# Patient Record
Sex: FEMALE | Race: WHITE | HISPANIC OR LATINO | Employment: UNEMPLOYED | URBAN - METROPOLITAN AREA
[De-identification: names, ages, dates, MRNs, and addresses within clinical notes are randomized per-mention and may not be internally consistent; named-entity substitution may affect disease eponyms.]

---

## 2017-05-12 ENCOUNTER — TELEPHONE (OUTPATIENT)
Dept: OTOLARYNGOLOGY | Facility: CLINIC | Age: 37
End: 2017-05-12

## 2017-05-12 NOTE — TELEPHONE ENCOUNTER
----- Message from Liberty Higgins sent at 5/12/2017 12:03 PM CDT -----  Contact: pts kailey Kuhn at [phone]  Kvng pt-Pt of Dr. Calderon pt was last seen Nov. 2016.  Pt  now has growth on neck/throat?  And was seen for this before.  Now recurring and son is asking for appt asap.  Please call kailey Kuhn at [phone].

## 2017-05-12 NOTE — PROGRESS NOTES
Spoke with patient's son. 7 days of increasing pain and swelling of the throat. Worsening each day.      I recommended that she go to the emergency room for evaluation. I have concern that she may have developed a recurrent peritonsillar abscess which needs drainage.    I am out of the office this afternoon at a meeting, but we will schedule a follow up visit for her Monday.    All questions were answered, and the patient's son was in agreement.

## 2019-02-05 ENCOUNTER — OFFICE VISIT (OUTPATIENT)
Dept: OTOLARYNGOLOGY | Facility: CLINIC | Age: 39
End: 2019-02-05

## 2019-02-05 VITALS
HEART RATE: 75 BPM | DIASTOLIC BLOOD PRESSURE: 70 MMHG | SYSTOLIC BLOOD PRESSURE: 111 MMHG | WEIGHT: 189.19 LBS | HEIGHT: 64 IN | TEMPERATURE: 98 F | BODY MASS INDEX: 32.3 KG/M2

## 2019-02-05 DIAGNOSIS — L04.0 ACUTE CERVICAL ADENITIS: ICD-10-CM

## 2019-02-05 DIAGNOSIS — J35.8 TONSILLITH: ICD-10-CM

## 2019-02-05 DIAGNOSIS — J30.9 ALLERGIC RHINITIS, UNSPECIFIED SEASONALITY, UNSPECIFIED TRIGGER: ICD-10-CM

## 2019-02-05 DIAGNOSIS — J03.01 ACUTE RECURRENT STREPTOCOCCAL TONSILLITIS: Primary | ICD-10-CM

## 2019-02-05 DIAGNOSIS — Z87.09 HISTORY OF PERITONSILLAR ABSCESS: ICD-10-CM

## 2019-02-05 DIAGNOSIS — J34.2 NASAL SEPTAL DEVIATION: ICD-10-CM

## 2019-02-05 DIAGNOSIS — R19.6 HALITOSIS: ICD-10-CM

## 2019-02-05 PROCEDURE — 96372 PR INJECTION,THERAP/PROPH/DIAG2ST, IM OR SUBCUT: ICD-10-PCS | Mod: S$GLB,,, | Performed by: SPECIALIST

## 2019-02-05 PROCEDURE — 99214 OFFICE O/P EST MOD 30 MIN: CPT | Mod: 25,S$GLB,, | Performed by: SPECIALIST

## 2019-02-05 PROCEDURE — 99214 PR OFFICE/OUTPT VISIT, EST, LEVL IV, 30-39 MIN: ICD-10-PCS | Mod: 25,S$GLB,, | Performed by: SPECIALIST

## 2019-02-05 PROCEDURE — 96372 THER/PROPH/DIAG INJ SC/IM: CPT | Mod: S$GLB,,, | Performed by: SPECIALIST

## 2019-02-05 RX ORDER — BETAMETHASONE SODIUM PHOSPHATE AND BETAMETHASONE ACETATE 3; 3 MG/ML; MG/ML
6 INJECTION, SUSPENSION INTRA-ARTICULAR; INTRALESIONAL; INTRAMUSCULAR; SOFT TISSUE ONCE
Status: COMPLETED | OUTPATIENT
Start: 2019-02-05 | End: 2019-02-05

## 2019-02-05 RX ORDER — HYDROCODONE BITARTRATE AND ACETAMINOPHEN 5; 325 MG/1; MG/1
TABLET ORAL
Qty: 24 TABLET | Refills: 0 | Status: ON HOLD | OUTPATIENT
Start: 2019-02-05 | End: 2019-04-01 | Stop reason: HOSPADM

## 2019-02-05 RX ORDER — LIDOCAINE HYDROCHLORIDE 10 MG/ML
2 INJECTION INFILTRATION; PERINEURAL ONCE
Status: COMPLETED | OUTPATIENT
Start: 2019-02-05 | End: 2019-02-05

## 2019-02-05 RX ORDER — CEFTRIAXONE 1 G/1
1 INJECTION, POWDER, FOR SOLUTION INTRAMUSCULAR; INTRAVENOUS
Status: COMPLETED | OUTPATIENT
Start: 2019-02-05 | End: 2019-02-05

## 2019-02-05 RX ORDER — AMOXICILLIN 875 MG/1
875 TABLET, FILM COATED ORAL 2 TIMES DAILY
Qty: 20 TABLET | Refills: 0 | Status: SHIPPED | OUTPATIENT
Start: 2019-02-05 | End: 2019-02-15

## 2019-02-05 RX ADMIN — LIDOCAINE HYDROCHLORIDE 2 ML: 10 INJECTION INFILTRATION; PERINEURAL at 03:02

## 2019-02-05 RX ADMIN — CEFTRIAXONE 1 G: 1 INJECTION, POWDER, FOR SOLUTION INTRAMUSCULAR; INTRAVENOUS at 03:02

## 2019-02-05 RX ADMIN — BETAMETHASONE SODIUM PHOSPHATE AND BETAMETHASONE ACETATE 6 MG: 3; 3 INJECTION, SUSPENSION INTRA-ARTICULAR; INTRALESIONAL; INTRAMUSCULAR; SOFT TISSUE at 03:02

## 2019-02-05 NOTE — PATIENT INSTRUCTIONS
Amigdalectomía, sangrado posoperatorio  Se le feng extirpado las amígdalas. El sitio de la operación puede sangrar después la cirugía. Habitualmente puede ser controlado por morrison médico con presión directa o por aplicación de medicamento para contraer los vasos sanguíneos. Si esto fracasa, usted necesitará volver a la yen de operaciones para un tratamiento adicional. Notifique a morrison médico enseguida o vuelva a [facility] si hay signos de cualquier sangrado adicional.  Cuidados en la casa  · Le dolerá la garganta. Ginger dolor después de la cirugía se irá aliviando rocael los próximos jodie a delfin días. Es normal sentir más dolor al final de la primera semana antes de que finalmente desaparezca.   · Los alimentos blandos serán más fáciles de tragar.  · Anju muchos líquidos para prevenir la deshidratación. Debe continuar bebiendo aunque le duela la garganta.  · Para aliviar el dolor, chupe cubos de hielo o paletas de fruta heladas, coma helados o sorbetes, y anju líquidos fríos. También puede usar paracetamol (acetaminofén) líquido. Evite el ibuprofeno y la aspirina. Estos medicamentos pueden aumentar el riesgo de sangrado. Si morrison médico le recetó analgésicos (calmantes del dolor), tómelos según las indicaciones.  · Si le recetaron antibióticos, tómelos según las instrucciones hasta terminarlos.  · No calefaccione morrison casa en exceso, ya que esto seca el aire y puede irritar los tejidos de la garganta, especialmente por la noche. Un humedecedor de hakeem frío en el dormitorio puede ser de ayuda.  · Evite el contacto con personas resfriadas o con gripe rocael el período de recuperación. Usted tiene más probabilidades de enfermarse rocael ginger período.  · Fumar irrita el sitio de la operación. Si usted fuma, ginger es un buen momento para abandonar el hábito.  · Evite el ejercicio vigoroso, levantar peso o hacer fuerza rocael los próximos 10 días.  Visitas de control  Oyng el seguimiento con morrison médico o en ginger centro  según le hayan aconsejado.  ¿Cuándo debe buscar atención médica?  Llame enseguida a morrison proveedor de atención médica si se presenta alguna de las siguientes situaciones:  · Dificultad para hablar, tragar o respirar  · Náuseas y vómitos  · Sangrado por la boca  · Fiebre con temperatura superior a 100.4° F (38.3° C)  · Aumento del dolor que no se controla con medicamentos calmantes  · Signos de deshidratación: orina muy oscura, menos orina, boca seca, debilidad  Date Last Reviewed: 4/20/2015  © 7942-5471 [employer] [address] [place] [id number]. Todos los derechos reservados. Esta información no pretende sustituir la atención médica profesional. Sólo morrison médico puede diagnosticar y tratar un problema de sinai.        Tonsillectomy, Post-Op Bleeding  You have had surgery to remove your tonsils. Bleeding at the surgery site can happen after surgery. The doctor can often control it using direct pressure or applying medicine to shrink the blood vessels. If this fails, you will need to return to the operating room for further treatment. Notify your doctor at once or return to this hospital if there are signs of any further bleeding.  Home Care  · Your throat will be sore. Throat pain after surgery improves over the first 3-5 days. It is normal to have more pain at the end of the first week before it finally goes away.  · Soft foods will be easier to swallow.  · Drink plenty of fluids to prevent dehydration. You must continue to drink even though your throat hurts.  · For pain relief, suck on ice cubes or frozen fruit pops, eat ice cream or sherbet, and drink cold liquids. You may also use liquid acetaminophen. Avoid taking ibuprofen or aspirin. These may increase bleeding risk. If your doctor has prescribed pain medication, take this as directed.   · If antibiotics were prescribed, take these as directed until they are gone.  · Do not overheat your home since this dries the air and may irritate  throat tissues, especially at night. A cool-mist humidifier in the bedroom may help.  · Avoid exposure to people with colds or the flu during the recovery period. You may be more likely to get ill during this time.  · Smoking irritates the surgery site. If you smoke, this is a good time to stop.  · Avoid any heavy exercise, lifting, or straining for the next 10 days.  Follow-up care  Follow up with your doctor or this facility as advised.  When to see medical advice  Call your doctor right away if any of the following occur:  · Trouble speaking, swallowing, or breathing  · Nausea and vomiting  · Bleeding from the mouth  · Fever over 100.4°F (38.3ºC)  · Increasing pain not controlled by pain medications  · Signs of dehydration: Very dark urine, less urine, dry mouth, weakness  Date Last Reviewed: 4/20/2015  © 0095-9751 [employer]. [address] [place] [address]. All rights reserved. This information is not intended as a substitute for professional medical care. Always follow your healthcare professional's instructions.        Después de la operación de amígdalas y adenoides     Beber abundante cantidad de líquidos ayudará a morrison hijo a recuperarse.     A morrison theresa le feng hecho sierra cirugía para sacrle las amígdalas y/o las adenoides. Necesitará tiempo para recuperarse. A continuación encontrará algunas pautas para la recuperación de morrison hijo.  Dolor y actividad  · Es probable que el theresa tenga dolor de oídos o garganta por 1 o 2 semanas.  · Limite analy actividades por 1-2 semanas o según le indiquen.   Dieta  Asegúrese de darle a morrison theresa suficientes líquidos y nutrientes, tales kristin:  · Eleazar bastantes líquidos, tales kristin agua, paletas y jugos suaves, que no luciano ácidos (evite los cítricos).  · Eleazar comidas blandas kristin gelatina, flan, helado, huevos revueltos, pasta y purés.  · Evite los alimentos calientes, picantes y ásperos, kristin fruta fresca, pan naman, galletas saladas y patsy  fritas.  Medicamentos  Eleazar únicamente los medicamentos que autorice el proveedor de atención médica de morrison hijo. Siga cuidadosamente las instrucciones sobre cuándo debe dárselos:  · Le podrían recetar calmantes para el dolor  · Evite siempre los medicamentos que contengan aspirina o ibuprofeno, ya que pueden provocarle sangrado. Para calmar las molestias, es mejor darle medicamentos con paracetamol (acetaminofén).  Cuándo debe llamar al médico  Después de la operación, es normal tener un poco de dolor y fiebre. El lugar de la operación se va a poner blancuzco mientras esté sanando. [place] es normal y no se trata de sierra infección. Sin embargo, llame enseguida al proveedor de atención médica de morrison hijo si observa que el theresa rafiq tiene cualquiera de las señales que se mencionan a continuación:  · Fiebre que continúa:  ¨ En niños de 3-36 meses, sierra temperatura rectal de 102 °F (39.0 °C) o más  ¨ En un theresa de cualquier edad que tiene sierra temperatura de 103 °F (39.4 °C) o más  ¨ Sierra fiebre que dura más de 24 horas en un theresa renate a 2 años o 3 días en un theresa mayor a 2 años.  ¨ Morrison hijo ha tenido sierra convulsión causada por la fiebre  · Dolor intenso que no se govind con los medicamentos   · Sangrado de un lira brillante, que incluye sangrado rápido, escupir o toser un coágulo jennifer o escupir saliva teñida de alon en forma persistente  · Dificultad para respirar   Date Last Reviewed: 9/8/2014  © 3323-8510 [employer]. [address] [place] [address], [place] [address]. Todos los derechos reservados. Esta información no pretende sustituir la atención médica profesional. Sólo morrison médico puede diagnosticar y tratar un problema de sinai.        Amigdalectomía en adultos    Las amígdalas son dos pequeñas masas de tejido situadas en la parte posterior de la garganta; everette parte del sistema inmunológico del cuerpo, que ayuda al organismo a combatir las enfermedades. En algunas personas, las amígdalas se infectan o  aumentan de tamaño y pueden causar radhames wali de garganta, ronquidos u otros problemas. La amigdalectomía es sierra operación en la que se extirpan (sacan) las amígdalas. Esta cirugía puede recomendarse si tiene alguna obstrucción que le provoque apnea del sueño, o infecciones recurrentes, crónicas o graves. Esta hoja le da más información sobre esta cirugía y lo que puede esperar que ocurra.  Prepárese para la cirugía  Prepárese siguiendo las instrucciones que le hayan dado. Informe a morrison proveedor de atención médica de todos los medicamentos que nadine, incluyendo también los que se adquieren sin receta, las hierbas medicinales y otros suplementos. Quizás tenga que dejar de francis algunos o todos estos medicamentos antes de la cirugía, según le indique morrison proveedor de atención médica. Además, siga las indicaciones que le den para dejar de comer o beber antes de la operación.  El día de la cirugía  La cirugía dura cerca de 60 minutos; es probable que usted regrese a casa el mismo día.  Antes de la cirugía  [place] es lo que puede esperar que suceda antes de que comience la cirugía:  · Le pondrán sierra sonda intravenosa (IV) en sierra vena del brazo o en sierra mano para administrarle líquidos y medicamentos.  · Para evitar que usted sienta dolor ankita la operación, le administrarán anestesia general. Ginger medicamento le inducirá un estado parecido al del sueño profundo a lo halina de toda la operación.  Ankita la cirugía  [place] es lo que puede esperar que suceda ankita la cirugía:  · Le colocarán en la boca un aparato especial para mantenerla abierta.  · Con ayuda de otros instrumentos se extirpan las amígdalas de la parte posterior de la garganta; el tejido se extrae a través de la boca.  · Por último se retira el aparato que mantiene abierta la boca.  Después de la cirugía  Lo trasladarán a sierra yen de recuperación. El personal de atención médica se asegurará de que usted pueda beber líquidos y se encargará de controlar  cualquier dolor que sienta. Sierra vez que esté listo para salir del hospital, un familiar o amigo adulto tendrá que conducirlo a morrison casa.  Recuperación en morrison casa  Es probable que tarde unas dos semanas en recuperarse de la operación. Rocael morrison recuperación:  · Es de esperar que tenga dolor de garganta. También podría sentir dolor en los oídos. [place] es un dolor referido de la garganta, y es normal. Morrison dolor posoperatorio podría aparecer y desaparecer, y ruth vez empeore el cuarto o khadijah día después de la operación.  · Hable lo menos posible, si le duele hacerlo.  · [place] analy analgésicos (calmantes del dolor) según las indicaciones.  · No maneje vehículos mientras esté tomando narcóticos o medicamentos opiáceos para el dolor. Es normal que sienta sueño o mareos al francis ginger medicamento.  · No tome ibuprofeno ni aspirina rocael 14 días después de la operación, a menos que morrison proveedor de atención médica le dé permiso para hacerlo. Puede usar acetaminofén según le indiquen.  · Póngase dos o jodie almohadas debajo de la carol al descansar, para ayudar a controlar la hinchazón.  · [place] muchos líquidos fríos. El agua, los jugos no cítricos y las barras de jugo congelado son buenas opciones.  · Coma alimentos fríos y blandos, que son los más fáciles de tragar. Pruebe a comer helado, gelatina, huevos revueltos, pasta y puré de patsy.  · Evite los alimentos que tenga que masticar mucho o los que puedan rasguñar la garganta, kristin el pan naman o las patsy fritas (chips). Absténgase de comer alimentos calientes, picantes o agrios.  · Evite las actividades enérgicas rocael dos o jodie semanas después de la operación.  · Sea consciente de que rocael la recuperación se formarán unas manchas jose alberto en la garganta. Estas manchas son placas y no son señal de infección. Las placas se desprenderán en sierra o dos semanas y pueden causar sangrado. Para reducir al mínimo el sangrado, tome mucho líquido. Hacer gárgaras con agua fría  puede aliviar estas molestias.  Cuándo llamar a morrison proveedor de atención médica  Llame a morrison proveedor de atención médica si tiene cualquiera de los siguientes síntomas:  · Dolor en el pecho o dificultades para respirar (llame al 911)  · Fiebre de 100.4° F (38° C) o más mary, o según le indique morrison proveedor de atención médica  · Sangrado de color lira brillante procedente de la boca o la nariz  · Dolor intenso que no se govind con medicamentos  · Señales de deshidratación (orina oscura o necesidad de orinar con menos frecuencia)  · Sangrado profuso o persistente en la garganta en cualquier momento  · Otras señales o síntomas que le indique morrison proveedor de atención médica   Visitas de control  Yong sierra farzana de control con morrison proveedor de atención médica según las indicaciones. Rocael esta farzana el proveedor de atención médica se asegurará de que usted se esté recuperando andria. Yong cualquier pregunta que tenga sobre la cirugía o morrison recuperación.  Riesgos y posibles complicaciones  Los riesgos de esta cirugía incluyen los siguientes:  · Infección  · Sangrado  · Lesiones a los labios o los dientes  · Dolor al tragar rocael la recuperación  · Necesidad de sierra segunda operación  · Riesgos relacionados con la anestesia   Date Last Reviewed: 6/11/2015  © 7200-8235 [employer]. [address] [place] [address], [place] [address]. Todos los derechos reservados. Esta información no pretende sustituir la atención médica profesional. Sólo morrison médico puede diagnosticar y tratar un problema de sinai.

## 2019-02-05 NOTE — PROGRESS NOTES
Subjective:       Patient ID: Mecca Bejarano is a 38 y.o. female.    Chief Complaint: Sore Throat (with [place] glands, white balls like on side of mouth and painful)    The patient has had a sore throat for the last week.  She has great difficulty swallowing solid foods and liquids.  She has not had fever.  She does have swelling in the lymph nodes in her neck, which also she typically has at least 2 episodes per year of tonsillitis.  In 2016 she had an episode of peritonsillar cellulitis.  When she has acute tonsillitis she always has a significant adenopathy in the neck.  Throughout the remaining part of the year she has chronic production of yellow tonsil stones with associated halitosis.      Review of Systems   Constitutional: Positive for fatigue and fever. Negative for activity change, appetite change, chills and unexpected weight change.   HENT: Positive for congestion, ear pain, postnasal drip, rhinorrhea, sore throat and trouble swallowing. Negative for ear discharge, facial swelling, hearing loss, mouth sores, sinus pressure, sinus pain, sneezing, tinnitus and voice change.         Chronic tonsillith formation  Halitosis secondarily   Eyes: Negative for photophobia, pain, discharge, redness, itching and visual disturbance.   Respiratory: Positive for cough. Negative for apnea, choking, shortness of breath and wheezing.    Cardiovascular: Negative for chest pain and palpitations.   Gastrointestinal: Negative for abdominal distention, abdominal pain, nausea and vomiting.   Musculoskeletal: Negative for arthralgias, myalgias, neck pain and neck stiffness.   Skin: Negative.  Negative for color change, pallor and rash.   Allergic/Immunologic: Negative for environmental allergies, food allergies and immunocompromised state.   Neurological: Positive for headaches. Negative for dizziness, facial asymmetry, speech difficulty, weakness, light-headedness and numbness.   Hematological: Positive for adenopathy. Does  not bruise/bleed easily.   Psychiatric/Behavioral: Negative for confusion, decreased concentration and sleep disturbance.       Objective:      Physical Exam   Constitutional: She is oriented to person, place, and time. She appears well-developed and well-nourished. She is cooperative.   HENT:   Head: Normocephalic.   Right Ear: External ear and ear canal normal. Tympanic membrane is retracted.   Left Ear: External ear and ear canal normal. Tympanic membrane is retracted.   Nose: Mucosal edema (cyanotic, boggy inferior turbinates bilaterally), rhinorrhea (clear mucus bilaterally) and septal deviation present.   Mouth/Throat: Uvula is midline and mucous membranes are normal. No oral lesions. Posterior oropharyngeal edema and posterior oropharyngeal erythema present. No oropharyngeal exudate or tonsillar abscesses. Tonsils are 3+ on the right. Tonsils are 3+ on the left. No tonsillar exudate (Intensely inflamed, cryptic with debris bilaterally).   Eyes: EOM and lids are normal. Pupils are equal, round, and reactive to light. Right eye exhibits no discharge and no exudate. Left eye exhibits no discharge and no exudate. Right conjunctiva is injected. Left conjunctiva is injected.   Neck: Trachea normal and normal range of motion. No muscular tenderness present. No tracheal deviation present. No thyroid mass and no thyromegaly present.   Cardiovascular: Normal rate, regular rhythm, normal heart sounds and normal pulses. Exam reveals no gallop.   No murmur heard.  Pulmonary/Chest: Effort normal and breath sounds normal. No stridor. She has no decreased breath sounds. She has no wheezes. She has no rhonchi. She has no rales.   Abdominal: Soft. Bowel sounds are normal. There is no tenderness.   Musculoskeletal: Normal range of motion.   Lymphadenopathy:        Head (right side): Tonsillar adenopathy present. No submental, no submandibular, no preauricular, no posterior auricular and no occipital adenopathy present.         Head (left side): Tonsillar adenopathy present. No submental, no submandibular, no preauricular, no posterior auricular and no occipital adenopathy present.     She has cervical adenopathy.        Right cervical: Deep cervical and posterior cervical adenopathy present.        Left cervical: Superficial cervical, deep cervical and posterior cervical adenopathy present.   Neurological: She is alert and oriented to person, place, and time. She has normal strength. No cranial nerve deficit or sensory deficit. Gait normal.   Skin: Skin is warm and dry. No petechiae and no rash noted. No cyanosis. Nails show no clubbing.   Psychiatric: She has a normal mood and affect. Her speech is normal and behavior is normal. Judgment and thought content normal. Cognition and memory are normal.       Rapid strep test-negative    Assessment:       1. Acute recurrent streptococcal tonsillitis    2. Tonsillith    3. Allergic rhinitis, unspecified seasonality, unspecified trigger    4. Nasal septal deviation    5. Acute cervical adenitis    6. History of peritonsillar abscess    7. Halitosis        Plan:       I will recheck the patient at the end of her antibiotics.  It is my recommendation that she undergo tonsillectomy. I have given her and her  who is accompanying her information regarding adult tonsillectomy, postoperative tonsillectomy bleeder and postoperative tonsillectomy care in Portuguese (her primary language, she speaks English poorly and her  is the ) to review.  We will discuss tonsillectomy further when they return.

## 2019-02-06 PROBLEM — Z87.09 HISTORY OF PERITONSILLAR ABSCESS: Status: ACTIVE | Noted: 2019-02-06

## 2019-02-06 PROBLEM — J03.01 ACUTE RECURRENT STREPTOCOCCAL TONSILLITIS: Status: ACTIVE | Noted: 2019-02-06

## 2019-02-21 ENCOUNTER — OFFICE VISIT (OUTPATIENT)
Dept: OTOLARYNGOLOGY | Facility: CLINIC | Age: 39
End: 2019-02-21

## 2019-02-21 VITALS
HEIGHT: 64 IN | BODY MASS INDEX: 32.27 KG/M2 | WEIGHT: 189 LBS | HEART RATE: 68 BPM | SYSTOLIC BLOOD PRESSURE: 114 MMHG | TEMPERATURE: 98 F | DIASTOLIC BLOOD PRESSURE: 69 MMHG

## 2019-02-21 DIAGNOSIS — J30.9 ALLERGIC RHINITIS, UNSPECIFIED SEASONALITY, UNSPECIFIED TRIGGER: ICD-10-CM

## 2019-02-21 DIAGNOSIS — J34.2 NASAL SEPTAL DEVIATION: ICD-10-CM

## 2019-02-21 DIAGNOSIS — J35.8 TONSILLITH: ICD-10-CM

## 2019-02-21 DIAGNOSIS — Z87.09 HISTORY OF PERITONSILLAR ABSCESS: ICD-10-CM

## 2019-02-21 DIAGNOSIS — J03.01 ACUTE RECURRENT STREPTOCOCCAL TONSILLITIS: Primary | ICD-10-CM

## 2019-02-21 PROCEDURE — 99214 PR OFFICE/OUTPT VISIT, EST, LEVL IV, 30-39 MIN: ICD-10-PCS | Mod: S$GLB,,, | Performed by: SPECIALIST

## 2019-02-21 PROCEDURE — 99214 OFFICE O/P EST MOD 30 MIN: CPT | Mod: S$GLB,,, | Performed by: SPECIALIST

## 2019-02-22 NOTE — PROGRESS NOTES
Subjective:       Patient ID: Mecca Bejarano is a 38 y.o. female.    Chief Complaint: Sore Throat (with Tonsils pain)    The patient is coming back for a follow-up visit.  Her sore throat has resolved.  She is accompanied by her son who is acting as an .  At last visit I discussed my recommendation of tonsillectomy for the patient.  I gave her literature in [place] regarding the procedure and its risks and benefits.  She continues to have the chronic tonsillith formation.  She would like to schedule surgery.      Review of Systems   Constitutional: Positive for fatigue. Negative for activity change, appetite change, chills, fever and unexpected weight change.   HENT: Positive for congestion, ear pain, postnasal drip, rhinorrhea, sore throat and trouble swallowing. Negative for ear discharge, facial swelling, hearing loss, mouth sores, sinus pressure, sinus pain, sneezing, tinnitus and voice change.    Eyes: Negative for photophobia, pain, discharge, redness, itching and visual disturbance.   Respiratory: Positive for cough. Negative for apnea, choking, shortness of breath and wheezing.    Cardiovascular: Negative for chest pain and palpitations.   Gastrointestinal: Negative for abdominal distention, abdominal pain, nausea and vomiting.   Musculoskeletal: Negative for arthralgias, myalgias, neck pain and neck stiffness.   Skin: Negative.  Negative for color change, pallor and rash.   Allergic/Immunologic: Negative for environmental allergies, food allergies and immunocompromised state.   Neurological: Positive for headaches. Negative for dizziness, facial asymmetry, speech difficulty, weakness, light-headedness and numbness.   Hematological: Positive for adenopathy. Does not bruise/bleed easily.   Psychiatric/Behavioral: Negative for confusion, decreased concentration and sleep disturbance.       Objective:      Physical Exam   Constitutional: She is oriented to person, place, and time. She appears  well-developed and well-nourished. She is cooperative.   HENT:   Head: Normocephalic.   Right Ear: External ear and ear canal normal. Tympanic membrane is retracted.   Left Ear: External ear and ear canal normal. Tympanic membrane is retracted.   Nose: Mucosal edema (cyanotic, boggy inferior turbinates bilaterally), rhinorrhea (clear mucus bilaterally) and septal deviation present.   Mouth/Throat: Uvula is midline, oropharynx is clear and moist and mucous membranes are normal. No oral lesions. Tonsils are 3+ on the right. Tonsils are 3+ on the left. No tonsillar exudate (Cryptic with debris in crypts, no exudates).   Eyes: EOM and lids are normal. Pupils are equal, round, and reactive to light. Right eye exhibits no discharge and no exudate. Left eye exhibits no discharge and no exudate. Right conjunctiva is injected. Left conjunctiva is injected.   Neck: Trachea normal and normal range of motion. No muscular tenderness present. No tracheal deviation present. No thyroid mass and no thyromegaly present.   Cardiovascular: Normal rate, regular rhythm, normal heart sounds and normal pulses. Exam reveals no gallop.   No murmur heard.  Pulmonary/Chest: Effort normal and breath sounds normal. No stridor. She has no decreased breath sounds. She has no wheezes. She has no rhonchi. She has no rales.   Abdominal: Soft. Bowel sounds are normal. There is no tenderness.   Musculoskeletal: Normal range of motion.   Extremities-symmetrical with no sinuses or clubbing   Lymphadenopathy:        Head (right side): No submental, no submandibular, no preauricular, no posterior auricular and no occipital adenopathy present.        Head (left side): No submental, no submandibular, no preauricular, no posterior auricular and no occipital adenopathy present.     She has no cervical adenopathy.   Neurological: She is alert and oriented to person, place, and time. She has normal strength. No cranial nerve deficit or sensory deficit. Gait  normal.   Skin: Skin is warm and dry. No petechiae and no rash noted. No cyanosis. Nails show no clubbing.   Psychiatric: She has a normal mood and affect. Her speech is normal and behavior is normal. Judgment and thought content normal. Cognition and memory are normal.       Assessment:       1. Acute recurrent streptococcal tonsillitis    2. History of peritonsillar abscess    3. Tonsillith    4. Nasal septal deviation    5. Allergic rhinitis, unspecified seasonality, unspecified trigger        Plan:       I have discussed the risks and benefits of tonsillectomy with the patient in the presence of her son who is acting as an .  I have answered all of her questions.  Operative consent was signed and witnessed.  I will recheck the patient 1 week postoperatively if surgery is done within the next month.  If it is postponed I will recheck her 1 week preoperatively.

## 2019-03-26 ENCOUNTER — ANESTHESIA EVENT (OUTPATIENT)
Dept: SURGERY | Facility: OTHER | Age: 39
End: 2019-03-26

## 2019-03-26 ENCOUNTER — HOSPITAL ENCOUNTER (OUTPATIENT)
Dept: PREADMISSION TESTING | Facility: OTHER | Age: 39
Discharge: HOME | End: 2019-03-26
Attending: SPECIALIST
Payer: MEDICAID

## 2019-03-26 VITALS
HEART RATE: 70 BPM | DIASTOLIC BLOOD PRESSURE: 66 MMHG | SYSTOLIC BLOOD PRESSURE: 137 MMHG | BODY MASS INDEX: 31.92 KG/M2 | HEIGHT: 64 IN | OXYGEN SATURATION: 99 % | WEIGHT: 187 LBS | TEMPERATURE: 98 F

## 2019-03-26 RX ORDER — ACETAMINOPHEN 500 MG
1000 TABLET ORAL
Status: CANCELLED | OUTPATIENT
Start: 2019-03-26 | End: 2019-03-26

## 2019-03-26 RX ORDER — SODIUM CHLORIDE, SODIUM LACTATE, POTASSIUM CHLORIDE, CALCIUM CHLORIDE 600; 310; 30; 20 MG/100ML; MG/100ML; MG/100ML; MG/100ML
INJECTION, SOLUTION INTRAVENOUS CONTINUOUS
Status: CANCELLED | OUTPATIENT
Start: 2019-03-26

## 2019-03-26 RX ORDER — LIDOCAINE HYDROCHLORIDE 10 MG/ML
0.5 INJECTION, SOLUTION EPIDURAL; INFILTRATION; INTRACAUDAL; PERINEURAL ONCE
Status: CANCELLED | OUTPATIENT
Start: 2019-03-26 | End: 2019-03-26

## 2019-03-26 RX ORDER — PREGABALIN 75 MG/1
75 CAPSULE ORAL
Status: CANCELLED | OUTPATIENT
Start: 2019-03-26 | End: 2019-03-26

## 2019-03-26 NOTE — DISCHARGE INSTRUCTIONS
PRE-ADMIT TESTING -  [phone]    [address]  [place] [facility]          Your surgery has been scheduled at [facility]. We are pleased to have the opportunity to serve you. For Further Information please call [phone].    On the day of surgery please report to the Information Desk on the 1st floor.    · CONTACT YOUR PHYSICIAN'S OFFICE THE DAY PRIOR TO YOUR SURGERY TO OBTAIN YOUR ARRIVAL TIME.     · The evening before surgery do not eat anything after 9 p.m. ( this includes hard candy, chewing gum and mints).  You may only have GATORADE, POWERADE AND WATER  from 9 p.m. until you leave your home.   DO NOT DRINK ANY LIQUIDS ON THE WAY TO THE HOSPITAL.      SPECIAL MEDICATION INSTRUCTIONS: TAKE medications checked off by the Anesthesiologist on your Medication List.    Angiogram Patients: Take medications as instructed by your physician, including aspirin.     Surgery Patients:    If you take ASPIRIN - Your PHYSICIAN/SURGEON will need to inform you IF/OR when you need to stop taking aspirin prior to your surgery.     Do Not take any medications containing IBUPROFEN.  Do Not Wear any make-up or dark nail polish   (especially eye make-up) to surgery. If you come to surgery with makeup on you will be required to remove the makeup or nail polish.    Do not shave your surgical area at least 5 days prior to your surgery. The surgical prep will be performed at the hospital according to Infection Control regulations.    Leave all valuables at home.   Do Not wear any jewelry or watches, including any metal in body piercings. Jewelry must be removed prior to coming to the hospital.  There is a possibility that rings that are unable to be removed may be cut off if they are on the surgical extremity.    Contact Lens must be removed before surgery. Either do not wear the contact lens or bring a case and solution for storage.  Please bring a container for eyeglasses or dentures as required.  Bring  any paperwork your physician has provided, such as consent forms,  history and physicals, doctor's orders, etc.   Bring comfortable clothes that are loose fitting to wear upon discharge. Take into consideration the type of surgery being performed.  Maintain your diet as advised per your physician the day prior to surgery.      Adequate rest the night before surgery is advised.   Park in the Parking lot behind the hospital or in the [facility] across the street from the parking lot. Parking is complimentary.  If you will be discharged the same day as your procedure, please arrange for a responsible adult to drive you home or to accompany you if traveling by taxi.   YOU WILL NOT BE PERMITTED TO DRIVE OR TO LEAVE THE HOSPITAL ALONE AFTER SURGERY.   It is strongly recommended that you arrange for someone to remain with you for the first 24 hrs following your surgery.       Thank you for your cooperation.  The Staff of [facility].                Bathing Instructions with Hibiclens     Shower the evening before and morning of your procedure with Hibiclens:   Wash your face with water and your regular face wash/soap   Apply Hibiclens directly on your skin or on a wet washcloth and wash gently. When showering: Move away from the shower stream when applying Hibiclens to avoid rinsing off too soon.   Rinse thoroughly with warm water   Do not dilute Hibiclens         Dry off as usual, do not use any deodorant, powder, body lotions, perfume, after shave or cologne.

## 2019-03-26 NOTE — ANESTHESIA PREPROCEDURE EVALUATION
03/26/2019  Mecca Bejarano is a 38 y.o., female.    Anesthesia Evaluation    I have reviewed the Patient Summary Reports.    I have reviewed the Nursing Notes.   I have reviewed the Medications.     Review of Systems  Anesthesia Hx:  Denies Family Hx of Anesthesia complications.   Denies Personal Hx of Anesthesia complications.   Social:  Non-Smoker    Hematology/Oncology:  Hematology Normal   Oncology Normal     EENT/Dental:EENT/Dental Normal   Cardiovascular:  Cardiovascular Normal     Pulmonary:  Pulmonary Normal    Renal/:  Renal/ Normal     Hepatic/GI:  Hepatic/GI Normal    Musculoskeletal:  Musculoskeletal Normal    Neurological:  Neurology Normal    Endocrine:  Endocrine Normal    Dermatological:  Skin Normal    Psych:  Psychiatric Normal           Physical Exam  General:  Obesity    Airway/Jaw/Neck:  Airway Findings: Mouth Opening: Normal Tongue: Normal  General Airway Assessment: Adult  Mallampati: II  TM Distance: Normal, at least 6 cm      Dental:  Dental Findings: In tact        Mental Status:  Mental Status Findings:  Alert and Oriented, Cooperative         Anesthesia Plan  Type of Anesthesia, risks & benefits discussed:  Anesthesia Type:  general  Patient's Preference:   Intra-op Monitoring Plan: standard ASA monitors  Intra-op Monitoring Plan Comments:   Post Op Pain Control Plan:   Post Op Pain Control Plan Comments:   Induction:   IV  Beta Blocker:         Informed Consent: Patient understands risks and agrees with Anesthesia plan.  Questions answered. Anesthesia consent signed with patient.  ASA Score: 2     Day of Surgery Review of History & Physical:    H&P update referred to the surgeon.     Anesthesia Plan Notes: No labs        Ready For Surgery From Anesthesia Perspective.

## 2019-03-29 ENCOUNTER — TELEPHONE (OUTPATIENT)
Dept: OTOLARYNGOLOGY | Facility: CLINIC | Age: 39
End: 2019-03-29

## 2019-04-01 ENCOUNTER — HOSPITAL ENCOUNTER (OUTPATIENT)
Facility: OTHER | Age: 39
Discharge: HOME | End: 2019-04-01
Attending: SPECIALIST | Admitting: SPECIALIST

## 2019-04-01 ENCOUNTER — ANESTHESIA (OUTPATIENT)
Dept: SURGERY | Facility: OTHER | Age: 39
End: 2019-04-01

## 2019-04-01 VITALS
SYSTOLIC BLOOD PRESSURE: 128 MMHG | BODY MASS INDEX: 31.92 KG/M2 | OXYGEN SATURATION: 100 % | WEIGHT: 187 LBS | HEART RATE: 91 BPM | TEMPERATURE: 98 F | HEIGHT: 64 IN | DIASTOLIC BLOOD PRESSURE: 71 MMHG | RESPIRATION RATE: 16 BRPM

## 2019-04-01 DIAGNOSIS — J03.01 ACUTE RECURRENT STREPTOCOCCAL TONSILLITIS: ICD-10-CM

## 2019-04-01 DIAGNOSIS — Z87.09 HISTORY OF PERITONSILLAR ABSCESS: Primary | ICD-10-CM

## 2019-04-01 LAB
B-HCG UR QL: NEGATIVE
CTP QC/QA: YES

## 2019-04-01 PROCEDURE — 71000015 HC POSTOP RECOV 1ST HR: Performed by: SPECIALIST

## 2019-04-01 PROCEDURE — 37000008 HC ANESTHESIA 1ST 15 MINUTES: Performed by: SPECIALIST

## 2019-04-01 PROCEDURE — 88304 TISSUE EXAM BY PATHOLOGIST: CPT | Performed by: PATHOLOGY

## 2019-04-01 PROCEDURE — 63600175 PHARM REV CODE 636 W HCPCS: Performed by: SPECIALIST

## 2019-04-01 PROCEDURE — 42826 REMOVAL OF TONSILS: CPT | Mod: ,,, | Performed by: SPECIALIST

## 2019-04-01 PROCEDURE — 71000033 HC RECOVERY, INTIAL HOUR: Performed by: SPECIALIST

## 2019-04-01 PROCEDURE — 63600175 PHARM REV CODE 636 W HCPCS: Performed by: ANESTHESIOLOGY

## 2019-04-01 PROCEDURE — 25000003 PHARM REV CODE 250: Performed by: NURSE ANESTHETIST, CERTIFIED REGISTERED

## 2019-04-01 PROCEDURE — 88304 TISSUE SPECIMEN TO PATHOLOGY - SURGERY: ICD-10-PCS | Mod: 26,,, | Performed by: PATHOLOGY

## 2019-04-01 PROCEDURE — 63600175 PHARM REV CODE 636 W HCPCS: Performed by: NURSE ANESTHETIST, CERTIFIED REGISTERED

## 2019-04-01 PROCEDURE — 42826 PR REMOVAL OF TONSILS,12+ Y/O: ICD-10-PCS | Mod: ,,, | Performed by: SPECIALIST

## 2019-04-01 PROCEDURE — 25000003 PHARM REV CODE 250: Performed by: ANESTHESIOLOGY

## 2019-04-01 PROCEDURE — 25000003 PHARM REV CODE 250: Performed by: SPECIALIST

## 2019-04-01 PROCEDURE — 71000016 HC POSTOP RECOV ADDL HR: Performed by: SPECIALIST

## 2019-04-01 PROCEDURE — 36000707: Performed by: SPECIALIST

## 2019-04-01 PROCEDURE — 88304 TISSUE EXAM BY PATHOLOGIST: CPT | Mod: 26,,, | Performed by: PATHOLOGY

## 2019-04-01 PROCEDURE — 37000009 HC ANESTHESIA EA ADD 15 MINS: Performed by: SPECIALIST

## 2019-04-01 PROCEDURE — 36000706: Performed by: SPECIALIST

## 2019-04-01 PROCEDURE — 81025 URINE PREGNANCY TEST: CPT | Performed by: ANESTHESIOLOGY

## 2019-04-01 RX ORDER — PREGABALIN 75 MG/1
75 CAPSULE ORAL
Status: COMPLETED | OUTPATIENT
Start: 2019-04-01 | End: 2019-04-01

## 2019-04-01 RX ORDER — OXYCODONE HCL 5 MG/5 ML
10 SOLUTION, ORAL ORAL EVERY 4 HOURS PRN
Status: CANCELLED | OUTPATIENT
Start: 2019-04-01

## 2019-04-01 RX ORDER — ACETAMINOPHEN 500 MG
1000 TABLET ORAL
Status: COMPLETED | OUTPATIENT
Start: 2019-04-01 | End: 2019-04-01

## 2019-04-01 RX ORDER — LIDOCAINE HYDROCHLORIDE 10 MG/ML
0.5 INJECTION, SOLUTION EPIDURAL; INFILTRATION; INTRACAUDAL; PERINEURAL ONCE
Status: DISCONTINUED | OUTPATIENT
Start: 2019-04-01 | End: 2019-04-01 | Stop reason: HOSPADM

## 2019-04-01 RX ORDER — MIDAZOLAM HYDROCHLORIDE 1 MG/ML
INJECTION INTRAMUSCULAR; INTRAVENOUS
Status: DISCONTINUED | OUTPATIENT
Start: 2019-04-01 | End: 2019-04-01

## 2019-04-01 RX ORDER — MEPERIDINE HYDROCHLORIDE 25 MG/ML
12.5 INJECTION INTRAMUSCULAR; INTRAVENOUS; SUBCUTANEOUS ONCE AS NEEDED
Status: DISCONTINUED | OUTPATIENT
Start: 2019-04-01 | End: 2019-04-01 | Stop reason: HOSPADM

## 2019-04-01 RX ORDER — OXYCODONE HCL 5 MG/5 ML
5 SOLUTION, ORAL ORAL EVERY 4 HOURS PRN
Status: CANCELLED | OUTPATIENT
Start: 2019-04-01

## 2019-04-01 RX ORDER — ROCURONIUM BROMIDE 10 MG/ML
INJECTION, SOLUTION INTRAVENOUS
Status: DISCONTINUED | OUTPATIENT
Start: 2019-04-01 | End: 2019-04-01

## 2019-04-01 RX ORDER — PROPOFOL 10 MG/ML
VIAL (ML) INTRAVENOUS
Status: DISCONTINUED | OUTPATIENT
Start: 2019-04-01 | End: 2019-04-01

## 2019-04-01 RX ORDER — ONDANSETRON 2 MG/ML
INJECTION INTRAMUSCULAR; INTRAVENOUS
Status: DISCONTINUED | OUTPATIENT
Start: 2019-04-01 | End: 2019-04-01

## 2019-04-01 RX ORDER — OXYCODONE HCL 5 MG/5 ML
SOLUTION, ORAL ORAL
Qty: 360 ML | Refills: 0 | Status: SHIPPED | OUTPATIENT
Start: 2019-04-01 | End: 2019-04-08 | Stop reason: SDUPTHER

## 2019-04-01 RX ORDER — OXYCODONE HYDROCHLORIDE 5 MG/1
5 TABLET ORAL
Status: DISCONTINUED | OUTPATIENT
Start: 2019-04-01 | End: 2019-04-01 | Stop reason: HOSPADM

## 2019-04-01 RX ORDER — SODIUM CHLORIDE, SODIUM LACTATE, POTASSIUM CHLORIDE, CALCIUM CHLORIDE 600; 310; 30; 20 MG/100ML; MG/100ML; MG/100ML; MG/100ML
INJECTION, SOLUTION INTRAVENOUS CONTINUOUS
Status: CANCELLED | OUTPATIENT
Start: 2019-04-01

## 2019-04-01 RX ORDER — DEXAMETHASONE SODIUM PHOSPHATE 4 MG/ML
8 INJECTION, SOLUTION INTRA-ARTICULAR; INTRALESIONAL; INTRAMUSCULAR; INTRAVENOUS; SOFT TISSUE ONCE
Status: COMPLETED | OUTPATIENT
Start: 2019-04-01 | End: 2019-04-01

## 2019-04-01 RX ORDER — ONDANSETRON 8 MG/1
8 TABLET, ORALLY DISINTEGRATING ORAL EVERY 6 HOURS PRN
Status: CANCELLED | OUTPATIENT
Start: 2019-04-01

## 2019-04-01 RX ORDER — LIDOCAINE HCL/PF 100 MG/5ML
SYRINGE (ML) INTRAVENOUS
Status: DISCONTINUED | OUTPATIENT
Start: 2019-04-01 | End: 2019-04-01

## 2019-04-01 RX ORDER — EPINEPHRINE 0.1 MG/ML
INJECTION INTRAVENOUS
Status: DISCONTINUED | OUTPATIENT
Start: 2019-04-01 | End: 2019-04-01 | Stop reason: HOSPADM

## 2019-04-01 RX ORDER — FENTANYL CITRATE 50 UG/ML
INJECTION, SOLUTION INTRAMUSCULAR; INTRAVENOUS
Status: DISCONTINUED | OUTPATIENT
Start: 2019-04-01 | End: 2019-04-01

## 2019-04-01 RX ORDER — SODIUM CHLORIDE, SODIUM LACTATE, POTASSIUM CHLORIDE, CALCIUM CHLORIDE 600; 310; 30; 20 MG/100ML; MG/100ML; MG/100ML; MG/100ML
INJECTION, SOLUTION INTRAVENOUS CONTINUOUS
Status: DISCONTINUED | OUTPATIENT
Start: 2019-04-01 | End: 2019-04-01 | Stop reason: HOSPADM

## 2019-04-01 RX ORDER — ONDANSETRON 2 MG/ML
4 INJECTION INTRAMUSCULAR; INTRAVENOUS DAILY PRN
Status: DISCONTINUED | OUTPATIENT
Start: 2019-04-01 | End: 2019-04-01 | Stop reason: HOSPADM

## 2019-04-01 RX ORDER — SODIUM CHLORIDE 0.9 % (FLUSH) 0.9 %
3 SYRINGE (ML) INJECTION
Status: DISCONTINUED | OUTPATIENT
Start: 2019-04-01 | End: 2019-04-01 | Stop reason: HOSPADM

## 2019-04-01 RX ORDER — BUPIVACAINE HYDROCHLORIDE 2.5 MG/ML
INJECTION, SOLUTION EPIDURAL; INFILTRATION; INTRACAUDAL
Status: DISCONTINUED | OUTPATIENT
Start: 2019-04-01 | End: 2019-04-01 | Stop reason: HOSPADM

## 2019-04-01 RX ORDER — NEOSTIGMINE METHYLSULFATE 1 MG/ML
INJECTION, SOLUTION INTRAVENOUS
Status: DISCONTINUED | OUTPATIENT
Start: 2019-04-01 | End: 2019-04-01

## 2019-04-01 RX ORDER — GLYCOPYRROLATE 0.2 MG/ML
INJECTION INTRAMUSCULAR; INTRAVENOUS
Status: DISCONTINUED | OUTPATIENT
Start: 2019-04-01 | End: 2019-04-01

## 2019-04-01 RX ORDER — HYDROMORPHONE HYDROCHLORIDE 2 MG/ML
0.4 INJECTION, SOLUTION INTRAMUSCULAR; INTRAVENOUS; SUBCUTANEOUS EVERY 5 MIN PRN
Status: DISCONTINUED | OUTPATIENT
Start: 2019-04-01 | End: 2019-04-01 | Stop reason: HOSPADM

## 2019-04-01 RX ADMIN — Medication 15 MG: at 02:04

## 2019-04-01 RX ADMIN — FENTANYL CITRATE 50 MCG: 50 INJECTION, SOLUTION INTRAMUSCULAR; INTRAVENOUS at 02:04

## 2019-04-01 RX ADMIN — ACETAMINOPHEN 1000 MG: 500 TABLET, FILM COATED ORAL at 01:04

## 2019-04-01 RX ADMIN — HYDROMORPHONE HYDROCHLORIDE 0.4 MG: 2 INJECTION INTRAMUSCULAR; INTRAVENOUS; SUBCUTANEOUS at 03:04

## 2019-04-01 RX ADMIN — HYDROMORPHONE HYDROCHLORIDE 0.4 MG: 2 INJECTION INTRAMUSCULAR; INTRAVENOUS; SUBCUTANEOUS at 04:04

## 2019-04-01 RX ADMIN — PROPOFOL 160 MG: 10 INJECTION, EMULSION INTRAVENOUS at 02:04

## 2019-04-01 RX ADMIN — ONDANSETRON 4 MG: 2 INJECTION INTRAMUSCULAR; INTRAVENOUS at 02:04

## 2019-04-01 RX ADMIN — PREGABALIN 75 MG: 75 CAPSULE ORAL at 01:04

## 2019-04-01 RX ADMIN — CARBOXYMETHYLCELLULOSE SODIUM 2 DROP: 2.5 SOLUTION/ DROPS OPHTHALMIC at 02:04

## 2019-04-01 RX ADMIN — SODIUM CHLORIDE, SODIUM LACTATE, POTASSIUM CHLORIDE, AND CALCIUM CHLORIDE: 600; 310; 30; 20 INJECTION, SOLUTION INTRAVENOUS at 02:04

## 2019-04-01 RX ADMIN — ROCURONIUM BROMIDE 35 MG: 10 INJECTION, SOLUTION INTRAVENOUS at 02:04

## 2019-04-01 RX ADMIN — GLYCOPYRROLATE 0.4 MG: 0.2 INJECTION, SOLUTION INTRAMUSCULAR; INTRAVENOUS at 03:04

## 2019-04-01 RX ADMIN — FENTANYL CITRATE 100 MCG: 50 INJECTION, SOLUTION INTRAMUSCULAR; INTRAVENOUS at 02:04

## 2019-04-01 RX ADMIN — ONDANSETRON 4 MG: 2 INJECTION INTRAMUSCULAR; INTRAVENOUS at 06:04

## 2019-04-01 RX ADMIN — MIDAZOLAM HYDROCHLORIDE 2 MG: 1 INJECTION, SOLUTION INTRAMUSCULAR; INTRAVENOUS at 02:04

## 2019-04-01 RX ADMIN — Medication 15 MG: at 03:04

## 2019-04-01 RX ADMIN — FENTANYL CITRATE 50 MCG: 50 INJECTION, SOLUTION INTRAMUSCULAR; INTRAVENOUS at 03:04

## 2019-04-01 RX ADMIN — NEOSTIGMINE METHYLSULFATE 5 MG: 1 INJECTION INTRAVENOUS at 03:04

## 2019-04-01 RX ADMIN — LIDOCAINE HYDROCHLORIDE 100 MG: 20 INJECTION, SOLUTION INTRAVENOUS at 02:04

## 2019-04-01 RX ADMIN — DEXAMETHASONE SODIUM PHOSPHATE 8 MG: 4 INJECTION, SOLUTION INTRAMUSCULAR; INTRAVENOUS at 02:04

## 2019-04-01 RX ADMIN — SODIUM CHLORIDE, SODIUM LACTATE, POTASSIUM CHLORIDE, AND CALCIUM CHLORIDE: 600; 310; 30; 20 INJECTION, SOLUTION INTRAVENOUS at 03:04

## 2019-04-01 NOTE — TRANSFER OF CARE
"Anesthesia Transfer of Care Note    Patient: Mecca Bejarano    Procedure(s) Performed: Procedure(s) (LRB):  TONSILLECTOMY (Bilateral)    Patient location: PACU    Anesthesia Type: general    Transport from OR: Transported from OR on 2-3 L/min O2 by NC with adequate spontaneous ventilation    Post pain: adequate analgesia    Post assessment: tolerated procedure well and no apparent anesthetic complications    Post vital signs: stable    Level of consciousness: awake, alert and oriented    Nausea/Vomiting: no nausea/vomiting    Complications: none    Transfer of care protocol was followed      Last vitals:   Visit Vitals  /71 (BP Location: Right arm, Patient Position: Sitting)   Pulse 71   Temp 37 °C (98.6 °F) (Oral)   Resp 16   Ht 5' 4" (1.626 m)   Wt 84.8 kg (187 lb)   LMP 03/15/2019   SpO2 100%   Breastfeeding? No   BMI 32.10 kg/m²     "

## 2019-04-01 NOTE — ANESTHESIA POSTPROCEDURE EVALUATION
Anesthesia Post Evaluation    Patient: Mecca Bejarano    Procedure(s) Performed: Procedure(s) (LRB):  TONSILLECTOMY (Bilateral)    Final Anesthesia Type: general  Patient location during evaluation: PACU  Patient participation: Yes- Able to Participate  Level of consciousness: awake and alert  Post-procedure vital signs: reviewed and stable  Pain management: adequate  Airway patency: patent  PONV status at discharge: No PONV  Anesthetic complications: no      Cardiovascular status: blood pressure returned to baseline  Respiratory status: unassisted and spontaneous ventilation  Hydration status: euvolemic  Follow-up not needed.          Vitals Value Taken Time   /77 4/1/2019  4:47 PM   Temp 36.6 °C (97.9 °F) 4/1/2019  4:47 PM   Pulse 58 4/1/2019  4:47 PM   Resp 16 4/1/2019  4:47 PM   SpO2 96 % 4/1/2019  4:47 PM         Event Time     Out of Recovery 16:44:00          Pain/Mikaela Score: Pain Rating Prior to Med Admin: 5 (4/1/2019  4:15 PM)  Pain Rating Post Med Admin: 2 (4/1/2019  4:30 PM)  Mikaela Score: 10 (4/1/2019  4:47 PM)

## 2019-04-01 NOTE — DISCHARGE INSTRUCTIONS
Adult Tonsillectomy  The tonsils are 2 small masses of tissue at the back of the throat. They are part of the bodys immune system. This helps the body fight disease. In some people, the tonsils become infected or enlarged. This can cause severe sore throats, snoring, or other problems. Tonsillectomy is surgery to remove the tonsils. Tonsillectomy may be recommended if you have obstruction causing sleep apnea, or recurring, chronic, or severe infections. This sheet tells you more about this surgery and what to expect.    After the surgery  You will be taken to a recovery room. Healthcare staff will make sure you can drink some liquids. They will also make sure your pain is being managed. When you are ready to leave the hospital, you will need to be driven home by an adult family member or friend.    Recovering at home  It will likely take about 2 weeks to heal from the surgery. During your recovery:  · Expect to have throat pain. You may also feel pain in your ears. This is referred pain from the throat, and is normal. Your post-surgery pain may come and go. It may be worse on the 4th or 5th day after surgery.  · Talk as little as possible, if it is painful.  · Take pain medicine as directed.  · Do not drive while you are on opioid or narcotic pain medicine. Expect to feel sleepy or dizzy while you are taking this medicine.  · Do not use ibuprofen or aspirin for 14 days after surgery unless your healthcare provider says its OK. You may use acetaminophen as directed.  · Use 2 or 3 pillows under your head while resting. This will help keep swelling down.  · Drink lots of chilled liquids. Water, noncitrus juices, and frozen juice bars are good choices.  · Eat cold foods and soft foods, which are easiest to swallow. Try foods like ice cream, gelatin, scrambled eggs, pasta, and mashed potatoes.  · Avoid foods that require a lot of chewing. Also avoid foods that may scratch the throat, like toast or potato chips.  Avoid hot, spicy, or acidic foods.  · Avoid strenuous activity for 2 to 3 weeks after surgery.  · Be aware that white patches will form in the throat during healing. These are scabs and are not a sign of infection. The patches will come off in 1 to 2 weeks and may cause bleeding. To minimize bleeding, drink lots of fluids. Gargling with cold water can help.    Call the healthcare provider  Call your healthcare provider if you have any of the following:  · Chest pain or trouble breathing (call 911)  · Fever of 100.4°F (38°C) or higher, or as directed by your healthcare provider  · Bright red bleeding from the mouth or nose  · Severe pain not relieved by medicine  · Signs of dehydration (dark urine, urinating less often)  · Heavy or persistent bleeding in the throat at any time  · Other signs or symptoms as indicated by your healthcare provider     Follow-up  Schedule a follow-up visit with your healthcare provider as advised. During this visit, the healthcare provider will make sure you are healing well. Ask any questions you have about the surgery or your recovery.    Risks and possible complications   Risks of this surgery include:  · Infection  · Bleeding  · Injury to the lips or teeth  · Painful swallowing during recovery  · The need for a second surgery  · Risks of anesthesia        Anesthesia: After Your Surgery  Youve just had surgery. During surgery, you received medication called anesthesia to keep you comfortable and pain-free. After surgery, you may experience some pain or nausea. This is common. Here are some tips for feeling better and recovering after surgery.    Going home  Your doctor or nurse will show you how to take care of yourself when you go home. He or she will also answer your questions. Have an adult family member or friend drive you home. For the first 24 hours after your surgery:    · Do not drive or use heavy equipment.  · Do not make important decisions or sign legal documents.  · Avoid  alcohol.  · Have someone stay with you, if needed. He or she can watch for problems and help keep you safe.    Be sure to keep all follow-up appointments with your doctor. And rest after your procedure for as long as your doctor tells you to.    Coping with pain  If you have pain after surgery, pain medication will help you feel better. Take it as directed, before pain becomes severe. Also, ask your doctor or pharmacist about other ways to control pain, such as with heat, ice, and relaxation. And follow any other instructions your surgeon or nurse gives you.    URINARY RETENTION  Should you experience a decrease in your urine output or are unable to urinate following surgery, this can be due to the medications given during surgery.  We recommend you going to the nearest Emergency Department.    Tips for taking pain medication  To get the best relief possible, remember these points:    · Pain medications can upset your stomach. Taking them with a little food may help.  · Most pain relievers taken by mouth need at least 20 to 30 minutes to take effect.  · Taking medication on a schedule can help you remember to take it. Try to time your medication so that you can take it before beginning an activity, such as dressing, walking, or sitting down for dinner.  · Constipation is a common side effect of pain medications. Contact your doctor before taking any medications like laxatives or stool softeners to help relieve constipation. Also ask about any dietary restrictions, because drinking lots of fluids and eating foods like fruits and vegetables that are high in fiber can also help. Remember, dont take laxatives unless your surgeon has prescribed them.  · Mixing alcohol and pain medication can cause dizziness and slow your breathing. It can even be fatal. Dont drink alcohol while taking pain medication.  · Pain medication can slow your reflexes. Dont drive or operate machinery while taking pain medication.    If your  health care provider tells you to take acetaminophen to help relieve your pain, ask him or her how much you are supposed to take each day. (Acetaminophen is the generic name for Tylenol and other brand-name pain relievers.) Acetaminophen or other pain relievers may interact with your prescription medicines or other over-the-counter (OTC) drugs. Some prescription medications contain acetaminophen along with other active ingredients. Using both prescription and OTC acetaminophen for pain can cause you to overdose. The FDA recommends that you read the labels on your OTC medications carefully. This will help you to clearly understand the list of active ingredients, dosing instructions, and any warnings. It may also help you avoid taking too much acetaminophen. If you have questions or don't understand the information, ask your pharmacist or health care provider to explain it to you before you take the OTC medication.    Managing nausea  Some people have an upset stomach after surgery. This is often due to anesthesia, pain, pain medications, or the stress of surgery. The following tips will help you manage nausea and get good nutrition as you recover. If you were on a special diet before surgery, ask your doctor if you should follow it during recovery. These tips may help:    · Dont push yourself to eat. Your body will tell you when to eat and how much.  · Start off with clear liquids and soup. They are easier to digest.  · Progress to semi-solid foods (mashed potatoes, applesauce, and gelatin) as you feel ready.  · Slowly move to solid foods. Dont eat fatty, rich, or spicy foods at first.  · Dont force yourself to have three large meals a day. Instead, eat smaller amounts more often.  · Take pain medications with a small amount of solid food, such as crackers or toast to avoid nausea.      Call your surgeon if    · You feel too sleepy, dizzy, or groggy (medication may be too strong).  · You have side effects like  nausea, vomiting, or skin changes (rash, itching, or hives).     © 4836-4625 [employer]. [address] [place] [address], [place] [address]. All rights reserved. This information is not intended as a substitute for professional medical care. Always follow your healthcare professional's instructions.    PLEASE FOLLOW ANY OTHER INSTRUCTIONS PROVIDED TO YOU BY DR. CABAN!

## 2019-04-01 NOTE — OP NOTE
Operative Note       Surgery Date: 4/1/2019     Surgeon(s) and Role:     * BILL Lyle MD - Primary    Pre-op Diagnosis:  Acute septic tonsillitis [J03.90]    Post-op Diagnosis: Post-Op Diagnosis Codes:     * Acute septic tonsillitis [J03.90]    Procedure(s) (LRB):  TONSILLECTOMY (Bilateral)    Anesthesia: General    Procedure in Detail/Findings:  The patient was placed on the operating table in spine position adequate general tracheal anesthesia was administered.  She was prepped and draped in usual fashion days come out schedule place mouth,, and suspended from a Garner stand. A red rubber catheter was placed transnasally and brought per orally to uses a soft palatal retractor.  There was no significant adenoid tissue.  Red rubber catheter was removed.  The tonsil removed in the following fashion, 1st the right than the left tonsil was grasped curved Allis clamp at its superior pole and superior pole of the tonsil was dissected from the mucosa and underlying fibromuscular attachments using a Bovie as a cutting, cauterizing and blunt dissecting instrument.  The tonsil was regrasped with the superior pole with a straight Allis and retracted superiorly and medially.  Incision was made down the anterior tonsillar pillar to the base tongue.  The tonsil was dissected in a subcapsular fashion from superiorly to inferiorly and anteriorly posteriorly.  The mucosa black triangular is was incised in the inferior pole vessels were dissected, cauterized and divided.  The posterior pillar mucosa was divided and the tonsil was removed.  The right tonsil was removed in identical fashion.  On both sides that were brisk bleeders which were initially controlled with a tonsil hemostat and then cauterized. Figure-of-eight hemostatic stitches were placed in each tonsillar fossa, 3 in the base of the tonsil, 2 in the midportion of the tonsil and 1 at the superior pole on each side.  2.5 mL of 0.25% Marcaine was infiltrated in the  tonsillar fossa.  A nasogastric tube was passed into the stomach and the stomach was suctioned of secretions.  There was no further bleeding. Estimated blood loss is 50 mL.  The patient tolerated the procedure well.  She was transferred to recovery room in stable and good condition.    Estimated Blood Loss: * No values recorded between 4/1/2019  2:33 PM and 4/1/2019  3:44 PM * 50 mL           Specimens (From admission, onward)    Start     Ordered    04/01/19 1439  Specimen to Pathology - Surgery  Once     Start Status     04/01/19 1439 Collected (04/01/19 1442) Order ID: [id number]       04/01/19 1442        Implants: * No implants in log *           Disposition: PACU - hemodynamically stable.           Condition: Good    Attestation:  I performed the procedure.           Discharge Note    Admit Date: 4/1/2019    Attending Physician: BILL Lyle MD     Discharge Physician: BILL Lyle MD    Final Diagnosis: Post-Op Diagnosis Codes:     * Acute septic tonsillitis [J03.90]    Disposition: Home or Self Care    Patient Instructions:   Current Discharge Medication List      START taking these medications    Details   oxyCODONE (ROXICODONE) 5 mg/5 mL Soln 1-2 tsp every 3-4 hours as needed to control pain  Qty: 360 mL, Refills: 0         STOP taking these medications       HYDROcodone-acetaminophen (NORCO) 5-325 mg per tablet Comments:   Reason for Stopping:               Discharge Procedure Orders (must include Diet, Follow-up, Activity)   Discharge Procedure Orders (must include Diet, Follow-up, Activity)   Diet general   Order Comments: Full liquid, soft foods, bland diet as tolerated     Lifting restrictions   Order Comments: Do not lift heavier than 10 pounds     Other restrictions (specify):   Order Comments: Advanced activity as tolerated, no strenuous activity.     Call MD for:  temperature >100.4     Call MD for:  persistent nausea and vomiting     Call MD for:  severe uncontrolled pain     Call MD for:   difficulty breathing, headache or visual disturbances     Call MD for:  redness, tenderness, or signs of infection (pain, swelling, redness, odor or green/yellow discharge around incision site)        Discharge Date: No discharge date for patient encounter.

## 2019-04-01 NOTE — H&P
[facility]  History & Physical    Subjective:      Chief Complaint/Reason for Admission:  Tonsillectomy    Mecca Bejarano is a 38 y.o. female who has recurring episodes of streptococcal tonsillitis and history of peritonsillar abscess.  She is admitted for tonsillectomy. There are no other significant comorbid conditions.    No past medical history on file.  Past Surgical History:   Procedure Laterality Date     SECTION, CLASSIC       No family history on file.  Social History     Tobacco Use    Smoking status: Never Smoker    Smokeless tobacco: Never Used   Substance Use Topics    Alcohol use: Yes     Comment: little    Drug use: No       PTA Medications   Medication Sig    HYDROcodone-acetaminophen (NORCO) 5-325 mg per tablet One or 2 tablets every 4-6 hours as needed to control pain     Review of patient's allergies indicates:   Allergen Reactions    Morphine Itching        Review of Systems   Constitutional: Negative.    HENT: Positive for sore throat. Negative for congestion, ear pain, hearing loss, nosebleeds and sinus pain.    Eyes: Negative.    Respiratory: Negative.    Cardiovascular: Negative.    Gastrointestinal: Negative.    Genitourinary: Negative.    Musculoskeletal: Negative.    Skin: Negative.    Neurological: Negative.    Endo/Heme/Allergies: Positive for environmental allergies. Does not bruise/bleed easily.   Psychiatric/Behavioral: Negative.        Objective:      Vital Signs (Most Recent)       Vital Signs Range (Last 24H):       Physical Exam   Constitutional: She is oriented to person, place, and time. She appears well-developed and well-nourished. No distress.   HENT:   Head: Normocephalic.   Right Ear: External ear normal.   Left Ear: External ear normal.   Nose: Nose normal.   Mouth/Throat: Oropharynx is clear and moist. No oropharyngeal exudate.   HEENT-tonsils 3+/3+ enlarged with no exudate   Eyes: Pupils are equal, round, and reactive to light.  Conjunctivae and EOM are normal.   Neck: Normal range of motion.   Cardiovascular: Normal rate, regular rhythm and normal heart sounds.   Pulmonary/Chest: Effort normal and breath sounds normal. No respiratory distress.   Abdominal: Soft. Bowel sounds are normal. There is no tenderness.   Musculoskeletal: Normal range of motion. She exhibits no tenderness or deformity.   Neurological: She is alert and oriented to person, place, and time. No cranial nerve deficit.   Skin: Skin is warm and dry. No rash noted.   Psychiatric: She has a normal mood and affect. Her behavior is normal. Judgment and thought content normal.       Data Review:    CBC:   Lab Results   Component Value Date    WBC 11.39 10/01/2016    RBC 4.10 10/01/2016    HGB 12.3 10/01/2016    HCT 35.5 (L) 10/01/2016     10/01/2016      ECG: None.     Assessment:      Active Hospital Problems    Diagnosis  POA    Acute recurrent streptococcal tonsillitis [J03.01]  Yes      Resolved Hospital Problems   No resolved problems to display.       Plan:    The patient is being admitted for outpatient tonsillectomy.

## 2019-04-02 ENCOUNTER — NURSE TRIAGE (OUTPATIENT)
Dept: ADMINISTRATIVE | Facility: CLINIC | Age: 39
End: 2019-04-02

## 2019-04-02 NOTE — TELEPHONE ENCOUNTER
She had tonsels removed yesterday, today she's been having a lot of pain in her ears and congestion and sharp pain in her throat, she's having difficulty breathing, feels like she's choking.  Painful and slightly difficult to swallow.  She is taking the oxycodone, it helps some with the pain, but not with the mucous and throat congestion.  Paged  ENT on call, awaiting response.    Reason for Disposition   [1] SEVERE post-op pain (e.g., excruciating, pain scale 8-10) AND [2] not controlled with pain medications    Protocols used: POST-OP SYMPTOMS AND [id number]

## 2019-04-02 NOTE — PLAN OF CARE
Mecca Maki Carlee has met all discharge criteria from Phase II. Vital Signs are stable, ambulating  without difficulty. Discharge instructions given, patient verbalized understanding. Discharged from facility via wheelchair in stable condition.

## 2019-04-03 ENCOUNTER — TELEPHONE (OUTPATIENT)
Dept: OTOLARYNGOLOGY | Facility: CLINIC | Age: 39
End: 2019-04-03

## 2019-04-03 NOTE — TELEPHONE ENCOUNTER
S/w Urban Angulo, he explains these symptoms are fairly typical post operatively, continue to take the oxycodone as ordered, and if difficulty breathing or swallowing worsens, she should call back or go to the ED.  Informed pt's son of the above.

## 2019-04-03 NOTE — TELEPHONE ENCOUNTER
Returned call to pt son (Valdo). Gave him info per MD about dental care and also informed him of other non pharmacological remedies for pain such as elevating the head and also applying a cold compress to the neck.      ----- Message from Renetta Talley sent at 4/3/2019  3:42 PM CDT -----  Contact: Pt's Son Valdo  Name of Who is Calling: Pt's Son Valdo    What is the request in detail: Pt's Son Valdo is requesting to speak with the nurse in regards to his mother recent surgery. Please call to further discuss and advise.     Can the clinic reply by [facility]: No     What Number to Call Back if not [facility]: Pt's Son Valdo # [phone]

## 2019-04-05 ENCOUNTER — OFFICE VISIT (OUTPATIENT)
Dept: OTOLARYNGOLOGY | Facility: CLINIC | Age: 39
End: 2019-04-05

## 2019-04-05 ENCOUNTER — TELEPHONE (OUTPATIENT)
Dept: OTOLARYNGOLOGY | Facility: CLINIC | Age: 39
End: 2019-04-05

## 2019-04-05 VITALS
HEIGHT: 64 IN | SYSTOLIC BLOOD PRESSURE: 105 MMHG | DIASTOLIC BLOOD PRESSURE: 75 MMHG | HEART RATE: 77 BPM | TEMPERATURE: 99 F | WEIGHT: 181 LBS | BODY MASS INDEX: 30.9 KG/M2

## 2019-04-05 DIAGNOSIS — R19.6 HALITOSIS: ICD-10-CM

## 2019-04-05 DIAGNOSIS — H92.03 OTALGIA OF BOTH EARS: ICD-10-CM

## 2019-04-05 DIAGNOSIS — R13.10 ODYNOPHAGIA: ICD-10-CM

## 2019-04-05 DIAGNOSIS — J03.01 ACUTE RECURRENT STREPTOCOCCAL TONSILLITIS: Primary | ICD-10-CM

## 2019-04-05 PROCEDURE — 99024 PR POST-OP FOLLOW-UP VISIT: ICD-10-PCS | Mod: S$GLB,,, | Performed by: SPECIALIST

## 2019-04-05 PROCEDURE — 96372 PR INJECTION,THERAP/PROPH/DIAG2ST, IM OR SUBCUT: ICD-10-PCS | Mod: S$GLB,,, | Performed by: SPECIALIST

## 2019-04-05 PROCEDURE — 99024 POSTOP FOLLOW-UP VISIT: CPT | Mod: S$GLB,,, | Performed by: SPECIALIST

## 2019-04-05 PROCEDURE — 96372 THER/PROPH/DIAG INJ SC/IM: CPT | Mod: S$GLB,,, | Performed by: SPECIALIST

## 2019-04-05 RX ORDER — PENICILLIN V POTASSIUM 500 MG/1
TABLET, FILM COATED ORAL
Refills: 0 | COMMUNITY
Start: 2019-03-07 | End: 2019-04-05 | Stop reason: SDUPTHER

## 2019-04-05 RX ORDER — BETAMETHASONE SODIUM PHOSPHATE AND BETAMETHASONE ACETATE 3; 3 MG/ML; MG/ML
12 INJECTION, SUSPENSION INTRA-ARTICULAR; INTRALESIONAL; INTRAMUSCULAR; SOFT TISSUE ONCE
Status: COMPLETED | OUTPATIENT
Start: 2019-04-05 | End: 2019-04-05

## 2019-04-05 RX ADMIN — BETAMETHASONE SODIUM PHOSPHATE AND BETAMETHASONE ACETATE 12 MG: 3; 3 INJECTION, SUSPENSION INTRA-ARTICULAR; INTRALESIONAL; INTRAMUSCULAR; SOFT TISSUE at 12:04

## 2019-04-05 NOTE — PROGRESS NOTES
Informed patient of medication and side effects. Advised to wait at least 15 min after injection to monitor for adverse reactions. Verbalized understanding. Injection x1 given. Tolerated well.

## 2019-04-05 NOTE — TELEPHONE ENCOUNTER
Returning 's phone call. States wife is weak, not only drinking about 5 oz of fluid at each intake. Reports bad breath and mucus. Wants to make sure she is ok. Informed pt to bring her in at their earliest convenience. Appt confirmed for today at 11 am.  ----- Message from Fatoumata Lam sent at 4/5/2019  9:05 AM CDT -----  Contact: milagros  Name of Who is Calling: milagros pt       What is the request in detail: Patient  states she is not feeling well and she has swelling he thinks she has a infection he needs to know what should he do       Can the clinic reply by [facility]: no      What Number to Call Back if not in [facility]: [phone]

## 2019-04-05 NOTE — PROGRESS NOTES
Subjective:       Patient ID: Mecca Bejarano is a 38 y.o. female.    Chief Complaint: Oral Pain and Otalgia    The patient is now 4 days postop.  Over the last 24 hr she has developed severe bad breath.  She is not maintaining hydration.  She is drinking about 8 oz every 6-8 hours.  She has been using any oral pain medication.  She finds it very difficult to swallow.  She has severe pain in both ears.    Review of Systems   Constitutional: Positive for appetite change and fatigue.   HENT: Positive for congestion, ear pain, mouth sores, sore throat and trouble swallowing.         Snoring  Throat clearing   Respiratory: Positive for choking. Negative for apnea, cough and chest tightness.    Allergic/Immunologic: Positive for environmental allergies.   Neurological: Positive for facial asymmetry.       Objective:      Physical Exam   HENT:   HEENT-edema of soft palate and uvula with palate posterior margin just above base of tongue and uvula hangs well beneath tongue, dense eschar in the tonsillar fossa bilaterally with significantly malodorous breath  Neck-tender jugulodigastric and superior cervical lymph nodes bilaterally       Assessment:       1. Acute recurrent streptococcal tonsillitis    2. Halitosis    3. Odynophagia    4. Otalgia of both ears        Plan:       I am giving the patient a steroid injection.  She will increase her diet and fluid intake as tolerated.  I will recheck her in her regular scheduled appointment in 3 days.  I told her  is acceptable for her to use saline gargles, but I did explain that that will not completely get rid of her bad breath.

## 2019-04-08 ENCOUNTER — OFFICE VISIT (OUTPATIENT)
Dept: OTOLARYNGOLOGY | Facility: CLINIC | Age: 39
End: 2019-04-08

## 2019-04-08 VITALS
BODY MASS INDEX: 30.75 KG/M2 | TEMPERATURE: 98 F | DIASTOLIC BLOOD PRESSURE: 71 MMHG | HEART RATE: 80 BPM | WEIGHT: 180.13 LBS | HEIGHT: 64 IN | SYSTOLIC BLOOD PRESSURE: 117 MMHG

## 2019-04-08 DIAGNOSIS — Z87.09 HISTORY OF PERITONSILLAR ABSCESS: ICD-10-CM

## 2019-04-08 DIAGNOSIS — J03.01 ACUTE RECURRENT STREPTOCOCCAL TONSILLITIS: Primary | ICD-10-CM

## 2019-04-08 DIAGNOSIS — L04.0 ACUTE CERVICAL ADENITIS: ICD-10-CM

## 2019-04-08 DIAGNOSIS — H92.03 OTALGIA OF BOTH EARS: ICD-10-CM

## 2019-04-08 PROCEDURE — 96372 PR INJECTION,THERAP/PROPH/DIAG2ST, IM OR SUBCUT: ICD-10-PCS | Mod: S$GLB,,, | Performed by: SPECIALIST

## 2019-04-08 PROCEDURE — 99024 PR POST-OP FOLLOW-UP VISIT: ICD-10-PCS | Mod: S$GLB,,, | Performed by: SPECIALIST

## 2019-04-08 PROCEDURE — 96372 THER/PROPH/DIAG INJ SC/IM: CPT | Mod: S$GLB,,, | Performed by: SPECIALIST

## 2019-04-08 PROCEDURE — 99024 POSTOP FOLLOW-UP VISIT: CPT | Mod: S$GLB,,, | Performed by: SPECIALIST

## 2019-04-08 RX ORDER — OXYCODONE HCL 5 MG/5 ML
SOLUTION, ORAL ORAL
Qty: 240 ML | Refills: 0 | Status: SHIPPED | OUTPATIENT
Start: 2019-04-08 | End: 2019-07-15 | Stop reason: SDUPTHER

## 2019-04-08 RX ORDER — BETAMETHASONE SODIUM PHOSPHATE AND BETAMETHASONE ACETATE 3; 3 MG/ML; MG/ML
6 INJECTION, SUSPENSION INTRA-ARTICULAR; INTRALESIONAL; INTRAMUSCULAR; SOFT TISSUE ONCE
Status: COMPLETED | OUTPATIENT
Start: 2019-04-08 | End: 2019-04-08

## 2019-04-08 RX ADMIN — BETAMETHASONE SODIUM PHOSPHATE AND BETAMETHASONE ACETATE 6 MG: 3; 3 INJECTION, SUSPENSION INTRA-ARTICULAR; INTRALESIONAL; INTRAMUSCULAR; SOFT TISSUE at 11:04

## 2019-04-08 NOTE — PROGRESS NOTES
Subjective:       Patient ID: Mecca Bejarano is a 38 y.o. female.    Chief Complaint: Post Op    The patient is now 1 week postop.  She did improved significantly after his steroid injection 3 days ago.  She is taking fluids better.  She continues to have severe pain in the throat and bilateral otalgia which is much worse on the left.  She is also having tenderness in lymph nodes in her neck bilaterally, again, worse on the left    Review of Systems   Constitutional: Positive for appetite change and fatigue.   HENT: Positive for ear pain, sore throat, trouble swallowing and voice change.    Respiratory: Negative for cough.    Gastrointestinal: Negative for nausea and vomiting.   Neurological: Positive for headaches.   Hematological: Positive for adenopathy.       Objective:      Physical Exam   HENT:   ENT-eschar in the tonsillar fossa is decreased in thickness, no change in edema of uvula or palate, white coating to the dorsum of the tongue, tympanic membranes clear  Neck-bilateral tender superior cervical and jugulodigastric lymph nodes       Assessment:       1. Acute recurrent streptococcal tonsillitis    2. History of peritonsillar abscess    3. Acute cervical adenitis    4. Otalgia of both ears        Plan:       I will recheck the patient in 1 week.  She will gradually increase her activity and diet as tolerated.  I am giving her additional pain medicine.

## 2019-04-16 ENCOUNTER — OFFICE VISIT (OUTPATIENT)
Dept: OTOLARYNGOLOGY | Facility: CLINIC | Age: 39
End: 2019-04-16

## 2019-04-16 VITALS — WEIGHT: 180 LBS | BODY MASS INDEX: 30.73 KG/M2 | HEIGHT: 64 IN

## 2019-04-16 DIAGNOSIS — J35.8 TONSILLITH: ICD-10-CM

## 2019-04-16 DIAGNOSIS — J03.01 ACUTE RECURRENT STREPTOCOCCAL TONSILLITIS: Primary | ICD-10-CM

## 2019-04-16 DIAGNOSIS — Z87.09 HISTORY OF PERITONSILLAR ABSCESS: ICD-10-CM

## 2019-04-16 PROCEDURE — 99024 PR POST-OP FOLLOW-UP VISIT: ICD-10-PCS | Mod: S$GLB,,, | Performed by: SPECIALIST

## 2019-04-16 PROCEDURE — 99024 POSTOP FOLLOW-UP VISIT: CPT | Mod: S$GLB,,, | Performed by: SPECIALIST

## 2019-04-16 NOTE — PROGRESS NOTES
Subjective:       Patient ID: Mecca Bejarano is a 38 y.o. female.    Chief Complaint: Follow-up; Post-op Evaluation; and Sinus Problem    The patient is now 2 weeks postop.  She still has pain in her throat and ears, although both her better.  She is eating a soft diet.  She does not have fever.  She has had no bleeding.    Review of Systems   Constitutional: Positive for fatigue.   HENT: Positive for congestion, ear pain, mouth sores, sore throat and trouble swallowing.    Neurological: Positive for headaches.       Objective:      Physical Exam    Assessment:       1. Acute recurrent streptococcal tonsillitis    2. History of peritonsillar abscess    3. Tonsillith        Plan:       ***

## 2019-04-16 NOTE — PROGRESS NOTES
Subjective:       Patient ID: Mecca Bejarano is a 38 y.o. female.    Chief Complaint: Follow-up; Post-op Evaluation; and Sinus Problem    The patient is now 2 weeks postop.  She has no fever.  She is tolerating a soft diet.  She continues have throat and ear pain, although both are significantly improved from last visit.    Review of Systems   Constitutional: Positive for fatigue.   HENT: Positive for ear pain, mouth sores, sore throat and trouble swallowing.    Neurological: Positive for headaches.       Objective:      Physical Exam   HENT:   ENT-normal eschar to tonsillar fossa, edema of uvula has largely resolved       Assessment:       1. Acute recurrent streptococcal tonsillitis    2. History of peritonsillar abscess    3. Tonsillith        Plan:       I will recheck the patient in 2 weeks.  She may gradually increase her activity and diet as tolerated.

## 2019-05-06 ENCOUNTER — OFFICE VISIT (OUTPATIENT)
Dept: OTOLARYNGOLOGY | Facility: CLINIC | Age: 39
End: 2019-05-06

## 2019-05-06 VITALS
DIASTOLIC BLOOD PRESSURE: 65 MMHG | HEART RATE: 66 BPM | BODY MASS INDEX: 30.07 KG/M2 | HEIGHT: 64 IN | TEMPERATURE: 98 F | WEIGHT: 176.13 LBS | SYSTOLIC BLOOD PRESSURE: 108 MMHG

## 2019-05-06 DIAGNOSIS — R43.9 SMELL AND TASTE DISORDER: ICD-10-CM

## 2019-05-06 DIAGNOSIS — J03.01 ACUTE RECURRENT STREPTOCOCCAL TONSILLITIS: Primary | ICD-10-CM

## 2019-05-06 DIAGNOSIS — K13.79 VELOPHARYNGEAL INSUFFICIENCY, ACQUIRED: ICD-10-CM

## 2019-05-06 PROCEDURE — 99024 PR POST-OP FOLLOW-UP VISIT: ICD-10-PCS | Mod: S$GLB,,, | Performed by: SPECIALIST

## 2019-05-06 PROCEDURE — 99024 POSTOP FOLLOW-UP VISIT: CPT | Mod: S$GLB,,, | Performed by: SPECIALIST

## 2019-05-06 NOTE — PROGRESS NOTES
Subjective:       Patient ID: Mecca Bejarano is a 38 y.o. female.    Chief Complaint: Follow up Post Op    The patient is 4 weeks postop.  She is accompanied by her son, who is acting as a .  She is having significantly less  pain at this point.  She is not having pain in her ears.  Her sense of taste has been poor since surgery. She is unable to appreciate sweet or sour taste.  When she takes a large swallows of clear liquids she may gets fluid in her nose. She is able take sips without difficulty.    Review of Systems   Constitutional: Negative for fever.   HENT: Positive for congestion, mouth sores, postnasal drip, sore throat and trouble swallowing. Negative for ear pain.    Respiratory: Negative for cough.    Neurological: Negative for headaches.       Objective:      Physical Exam   HENT:   Ears-Ear canals and tympanic membranes clear  Nose-septum deviated to the right with nasal crest deviated left, sign-out of turbinates with clear mucus bilaterally  Mouth/oropharynx-no edema of soft palate or uvula, minimum superficial granulation present at superior pole of left tonsillar fossa, no other or lesions noted   Neck: Normal range of motion. Neck supple.   Lymphadenopathy:     She has no cervical adenopathy.       Assessment:       1. Acute recurrent streptococcal tonsillitis    2. Smell and taste disorder    3. Velopharyngeal insufficiency, acquired        Plan:       I will have the patient begin using balloon insufflation at least 4-5 times per day.  She will begin with larger balloons and progress to smaller ones at as she is able to see successfully inflate the larger balloons.  I have explained that the taste and smell will return with time.  I will recheck her in 4 weeks.

## 2019-06-03 ENCOUNTER — OFFICE VISIT (OUTPATIENT)
Dept: OTOLARYNGOLOGY | Facility: CLINIC | Age: 39
End: 2019-06-03

## 2019-06-03 VITALS
DIASTOLIC BLOOD PRESSURE: 82 MMHG | SYSTOLIC BLOOD PRESSURE: 115 MMHG | WEIGHT: 176 LBS | HEART RATE: 75 BPM | BODY MASS INDEX: 30.05 KG/M2 | HEIGHT: 64 IN

## 2019-06-03 DIAGNOSIS — J30.9 ALLERGIC RHINITIS, UNSPECIFIED SEASONALITY, UNSPECIFIED TRIGGER: ICD-10-CM

## 2019-06-03 DIAGNOSIS — J03.01 ACUTE RECURRENT STREPTOCOCCAL TONSILLITIS: ICD-10-CM

## 2019-06-03 DIAGNOSIS — R43.9 SMELL AND TASTE DISORDER: Primary | ICD-10-CM

## 2019-06-03 PROCEDURE — 99024 POSTOP FOLLOW-UP VISIT: CPT | Mod: S$GLB,,, | Performed by: SPECIALIST

## 2019-06-03 PROCEDURE — 99024 PR POST-OP FOLLOW-UP VISIT: ICD-10-PCS | Mod: S$GLB,,, | Performed by: SPECIALIST

## 2019-06-03 NOTE — PROGRESS NOTES
Subjective:       Patient ID: Mecca Bejarano is a 38 y.o. female.    Chief Complaint: Follow-up; Sinus Problem; and Post-op Evaluation    The patient is now 8 weeks postop.  She continues to have some decreased acuity in the taste sensation.  Her sense of taste is not unpleasant but rather less acute.  She did lose 20 lb overall and is attempting to keep that week off.  At last visit she was getting some liquid into her nose. She has been doing balloon insufflation and has not had that problem in the last 3 weeks.  She has noted that when she yawns widely she feels like something is stretching in her throat    Review of Systems   HENT: Positive for congestion, postnasal drip, rhinorrhea and trouble swallowing. Negative for ear pain, hearing loss and mouth sores.         Decrease in acuity of taste sensation   Allergic/Immunologic: Positive for environmental allergies.   Neurological: Negative for headaches.       Objective:      Physical Exam   HENT:   HEENT-tonsillar fossa see reepithelialized.  Good motion of soft palate, tongue base normal appearing with mild white coating       Assessment:       1. Smell and taste disorder    2. Acute recurrent streptococcal tonsillitis    3. Allergic rhinitis, unspecified seasonality, unspecified trigger        Plan:       I have reassured the patient that I feel she will recover her sense of taste fully with time. I am recommending that she continue with balloon insufflation once daily.  I will recheck her in 4 weeks.  I have also explained that unless she continues to restricted caloric intake she will regain the 20 lb she loss from surgery.

## 2019-07-15 ENCOUNTER — OFFICE VISIT (OUTPATIENT)
Dept: OTOLARYNGOLOGY | Facility: CLINIC | Age: 39
End: 2019-07-15

## 2019-07-15 VITALS
HEART RATE: 70 BPM | DIASTOLIC BLOOD PRESSURE: 76 MMHG | WEIGHT: 173.13 LBS | SYSTOLIC BLOOD PRESSURE: 109 MMHG | BODY MASS INDEX: 29.56 KG/M2 | HEIGHT: 64 IN | TEMPERATURE: 98 F

## 2019-07-15 DIAGNOSIS — J30.9 ALLERGIC RHINITIS, UNSPECIFIED SEASONALITY, UNSPECIFIED TRIGGER: ICD-10-CM

## 2019-07-15 DIAGNOSIS — J34.2 NASAL SEPTAL DEVIATION: ICD-10-CM

## 2019-07-15 DIAGNOSIS — R43.9 SMELL AND TASTE DISORDER: Primary | ICD-10-CM

## 2019-07-15 PROBLEM — J35.8 TONSILLITH: Status: RESOLVED | Noted: 2019-02-05 | Resolved: 2019-07-15

## 2019-07-15 PROBLEM — J03.01 ACUTE RECURRENT STREPTOCOCCAL TONSILLITIS: Status: RESOLVED | Noted: 2019-02-06 | Resolved: 2019-07-15

## 2019-07-15 PROBLEM — Z87.09 HISTORY OF PERITONSILLAR ABSCESS: Status: RESOLVED | Noted: 2019-02-06 | Resolved: 2019-07-15

## 2019-07-15 PROBLEM — K13.79 VELOPHARYNGEAL INSUFFICIENCY, ACQUIRED: Status: RESOLVED | Noted: 2019-05-06 | Resolved: 2019-07-15

## 2019-07-15 PROCEDURE — 99214 OFFICE O/P EST MOD 30 MIN: CPT | Mod: S$GLB,,, | Performed by: SPECIALIST

## 2019-07-15 PROCEDURE — 99214 PR OFFICE/OUTPT VISIT, EST, LEVL IV, 30-39 MIN: ICD-10-PCS | Mod: S$GLB,,, | Performed by: SPECIALIST

## 2019-07-15 RX ORDER — UREA 10 %
220 LOTION (ML) TOPICAL DAILY
Qty: 30 TABLET | Refills: 11 | Status: SHIPPED | OUTPATIENT
Start: 2019-07-15

## 2019-07-15 NOTE — PROGRESS NOTES
Subjective:       Patient ID: Mecca Bejarano is a 39 y.o. female.    Chief Complaint: Follow-up (with no taste budd and swallowing problem)    The patient is now 14 weeks postop.  She continues to have no significant improvement in her loss of taste and smell.  Prior to that her tonsillectomy she had a normal sense of taste and smell.  Prior to her surgery meat tasted good to her.  It does not now.  Chicken does not have a palatable taste since her surgery. Her breathing is good.  She finds that she feels some tightness inside her throat when she opens her mouth widely or yawns.      Review of Systems   Constitutional: Negative for activity change, appetite change, chills, fatigue, fever and unexpected weight change.   HENT: Positive for congestion, postnasal drip and rhinorrhea. Negative for ear discharge, ear pain, facial swelling, hearing loss, mouth sores, sinus pressure, sinus pain, sneezing, sore throat, tinnitus, trouble swallowing and voice change.         Abnormal sense of taste and smell   Eyes: Negative for photophobia, pain, discharge, redness, itching and visual disturbance.   Respiratory: Negative for apnea, cough, choking, shortness of breath and wheezing.    Cardiovascular: Negative for chest pain and palpitations.   Gastrointestinal: Negative for abdominal distention, abdominal pain, nausea and vomiting.   Musculoskeletal: Negative for arthralgias, myalgias, neck pain and neck stiffness.   Skin: Negative.  Negative for color change, pallor and rash.   Allergic/Immunologic: Negative for environmental allergies, food allergies and immunocompromised state.   Neurological: Negative for dizziness, facial asymmetry, speech difficulty, weakness, light-headedness, numbness and headaches.   Hematological: Negative for adenopathy. Does not bruise/bleed easily.   Psychiatric/Behavioral: Negative for confusion, decreased concentration and sleep disturbance.       Objective:      Physical Exam    Constitutional: She is oriented to person, place, and time. She appears well-developed and well-nourished. She is cooperative.   HENT:   Head: Normocephalic.   Right Ear: External ear and ear canal normal. Tympanic membrane is retracted.   Left Ear: External ear and ear canal normal. Tympanic membrane is retracted.   Nose: Mucosal edema (cyanotic, boggy inferior turbinates bilaterally), rhinorrhea (clear mucus bilaterally) and septal deviation present.   Mouth/Throat: Uvula is midline, oropharynx is clear and moist and mucous membranes are normal. No oral lesions. Tonsils are 0 on the right. Tonsils are 0 on the left. No tonsillar exudate.   Eyes: Pupils are equal, round, and reactive to light. EOM and lids are normal. Right eye exhibits no discharge and no exudate. Left eye exhibits no discharge and no exudate. Right conjunctiva is injected. Left conjunctiva is injected.   Neck: Trachea normal and normal range of motion. No muscular tenderness present. No tracheal deviation present. No thyroid mass and no thyromegaly present.   Cardiovascular: Normal rate, regular rhythm, normal heart sounds and normal pulses.   Pulmonary/Chest: Effort normal and breath sounds normal. No stridor. She has no decreased breath sounds. She has no wheezes. She has no rhonchi. She has no rales.   Abdominal: Soft. Bowel sounds are normal. There is no tenderness.   Musculoskeletal: Normal range of motion.   Lymphadenopathy:        Head (right side): No submental, no submandibular, no preauricular, no posterior auricular and no occipital adenopathy present.        Head (left side): No submental, no submandibular, no preauricular, no posterior auricular and no occipital adenopathy present.     She has no cervical adenopathy.   Neurological: She is alert and oriented to person, place, and time. She has normal strength. No cranial nerve deficit or sensory deficit. Gait normal.   Skin: Skin is warm and dry. No petechiae and no rash noted. No  cyanosis. Nails show no clubbing.   Psychiatric: She has a normal mood and affect. Her speech is normal and behavior is normal. Judgment and thought content normal. Cognition and memory are normal.       Assessment:       1. Smell and taste disorder    2. Allergic rhinitis, unspecified seasonality, unspecified trigger    3. Nasal septal deviation        Plan:       I will start the patient on zinc supplementation and I will recheck her in 4 weeks.

## 2019-08-19 ENCOUNTER — OFFICE VISIT (OUTPATIENT)
Dept: OTOLARYNGOLOGY | Facility: CLINIC | Age: 39
End: 2019-08-19

## 2019-08-19 DIAGNOSIS — J34.2 NASAL SEPTAL DEVIATION: ICD-10-CM

## 2019-08-19 DIAGNOSIS — R43.9 SMELL AND TASTE DISORDER: Primary | ICD-10-CM

## 2019-08-19 DIAGNOSIS — J30.9 ALLERGIC RHINITIS, UNSPECIFIED SEASONALITY, UNSPECIFIED TRIGGER: ICD-10-CM

## 2019-08-19 PROCEDURE — 99213 PR OFFICE/OUTPT VISIT, EST, LEVL III, 20-29 MIN: ICD-10-PCS | Mod: S$GLB,,, | Performed by: SPECIALIST

## 2019-08-19 PROCEDURE — 99213 OFFICE O/P EST LOW 20 MIN: CPT | Mod: S$GLB,,, | Performed by: SPECIALIST

## 2019-08-19 RX ORDER — ZINC SULFATE 50(220)MG
CAPSULE ORAL
Refills: 11 | COMMUNITY
Start: 2019-07-15

## 2019-08-19 NOTE — PROGRESS NOTES
Subjective:       Patient ID: Mecca Bejarano is a 39 y.o. female.    Chief Complaint: Sinus Problem; Follow-up; and Post-op Evaluation    The patient is coming in for a follow-up visit.  She did undergo tonsillectomy on April 1, 2019.  Since that time she has had significant diminished sense of smell and taste.  At last visit 6 weeks ago I started her on zinc supplementation.  She does feel that her sense of taste has improved.  It is still sub normal, but significantly improved from previously.  She is having some congestion and postnasal drip.  She does have a sense of dryness in her throat on occasion.  This typically resolves with taking sips of water.  It has not significantly affected her ability to swallow either solids or liquids.    Review of Systems   Constitutional: Negative for activity change, appetite change, chills, fatigue, fever and unexpected weight change.   HENT: Positive for congestion, ear pain, postnasal drip, rhinorrhea, sore throat and trouble swallowing. Negative for ear discharge, facial swelling, hearing loss, mouth sores, sinus pressure, sinus pain, sneezing, tinnitus and voice change.         Abnormal sense of taste post tonsillectomy   Eyes: Negative for photophobia, pain, discharge, redness, itching and visual disturbance.   Respiratory: Negative for apnea, cough, choking, shortness of breath and wheezing.    Cardiovascular: Negative for chest pain and palpitations.   Gastrointestinal: Negative for abdominal distention, abdominal pain, nausea and vomiting.   Musculoskeletal: Negative for arthralgias, myalgias, neck pain and neck stiffness.   Skin: Negative.  Negative for color change, pallor and rash.   Allergic/Immunologic: Positive for environmental allergies. Negative for food allergies and immunocompromised state.   Neurological: Negative for dizziness, facial asymmetry, speech difficulty, weakness, light-headedness, numbness and headaches.   Hematological: Negative for  adenopathy. Does not bruise/bleed easily.   Psychiatric/Behavioral: Negative for confusion, decreased concentration and sleep disturbance.       Objective:      Physical Exam   Constitutional: She is oriented to person, place, and time. She appears well-developed and well-nourished. She is cooperative.   HENT:   Head: Normocephalic.   Right Ear: External ear and ear canal normal. Tympanic membrane is retracted.   Left Ear: External ear and ear canal normal. Tympanic membrane is retracted.   Nose: Mucosal edema (cyanotic, boggy inferior turbinates bilaterally), rhinorrhea (clear mucus bilaterally) and septal deviation (To the right) present.   Mouth/Throat: Uvula is midline, oropharynx is clear and moist and mucous membranes are normal. No oral lesions. Tonsils are 0 on the right. Tonsils are 0 on the left. No tonsillar exudate.   Eyes: Pupils are equal, round, and reactive to light. EOM and lids are normal. Right eye exhibits no discharge and no exudate. Left eye exhibits no discharge and no exudate. Right conjunctiva is injected. Left conjunctiva is injected.   Neck: Trachea normal and normal range of motion. No muscular tenderness present. No tracheal deviation present. No thyroid mass and no thyromegaly present.   Cardiovascular: Normal rate, regular rhythm, normal heart sounds and normal pulses.   Pulmonary/Chest: Effort normal and breath sounds normal. No stridor. She has no decreased breath sounds. She has no wheezes. She has no rhonchi. She has no rales.   Abdominal: Soft. Bowel sounds are normal. There is no tenderness.   Musculoskeletal: Normal range of motion.   Lymphadenopathy:        Head (right side): No submental, no submandibular, no preauricular, no posterior auricular and no occipital adenopathy present.        Head (left side): No submental, no submandibular, no preauricular, no posterior auricular and no occipital adenopathy present.     She has no cervical adenopathy.   Neurological: She is alert  and oriented to person, place, and time. She has normal strength. No cranial nerve deficit or sensory deficit. Gait normal.   Skin: Skin is warm and dry. No petechiae and no rash noted. No cyanosis. Nails show no clubbing.   Psychiatric: She has a normal mood and affect. Her speech is normal and behavior is normal. Judgment and thought content normal. Cognition and memory are normal.       Assessment:       1. Smell and taste disorder    2. Allergic rhinitis, unspecified seasonality, unspecified trigger    3. Nasal septal deviation        Plan:       I will have the patient continue with her zinc supplements.  I will recheck her in 2 months.

## 2020-05-29 ENCOUNTER — LAB VISIT (OUTPATIENT)
Dept: PRIMARY CARE CLINIC | Facility: CLINIC | Age: 40
End: 2020-05-29
Payer: OTHER GOVERNMENT

## 2020-05-29 DIAGNOSIS — Z11.59 ENCOUNTER FOR SCREENING FOR OTHER VIRAL DISEASES: Primary | ICD-10-CM

## 2020-05-29 PROCEDURE — U0003 INFECTIOUS AGENT DETECTION BY NUCLEIC ACID (DNA OR RNA); SEVERE ACUTE RESPIRATORY SYNDROME CORONAVIRUS 2 (SARS-COV-2) (CORONAVIRUS DISEASE [COVID-19]), AMPLIFIED PROBE TECHNIQUE, MAKING USE OF HIGH THROUGHPUT TECHNOLOGIES AS DESCRIBED BY CMS-2020-01-R: HCPCS

## 2020-05-29 NOTE — LETTER
June 1, 2020    Mecca Bejarano  [address] [place] [address]             Community Testing   Primary Care  [address] [place] [address] [place] [address]   Dear Ms. Nolascoa Carlee:    Below are the results from your recent visit:      Esta carta contiene el resultado de turner visita más reciente:    Examen de COVID-19:  Turner resultado es normal.     Por favor llame a nuestra oficina si tiene alguna pregunta o inquietud 0 .    Kristin Martinez PA-C

## 2020-05-31 LAB — SARS-COV-2 RNA RESP QL NAA+PROBE: NOT DETECTED

## 2021-04-16 ENCOUNTER — PATIENT MESSAGE (OUTPATIENT)
Dept: RESEARCH | Facility: HOSPITAL | Age: 41
End: 2021-04-16

## 2022-09-17 ENCOUNTER — IMMUNIZATION (OUTPATIENT)
Dept: PRIMARY CARE CLINIC | Facility: CLINIC | Age: 42
End: 2022-09-17
Payer: MEDICAID

## 2022-09-17 ENCOUNTER — IMMUNIZATION (OUTPATIENT)
Dept: INTERNAL MEDICINE | Facility: CLINIC | Age: 42
End: 2022-09-17
Payer: MEDICAID

## 2022-09-17 DIAGNOSIS — Z23 NEED FOR VACCINATION: Primary | ICD-10-CM

## 2022-09-17 PROCEDURE — 90611 SMALLPOX&MONKEYPOX VAC 0.5ML: CPT | Mod: PBBFAC

## 2022-09-17 PROCEDURE — 0124A COVID-19, MRNA, LNP-S, BIVALENT BOOSTER, PF, 30 MCG/0.3 ML DOSE: CPT | Mod: CV19,PBBFAC | Performed by: INTERNAL MEDICINE

## 2022-09-17 PROCEDURE — 91312 COVID-19, MRNA, LNP-S, BIVALENT BOOSTER, PF, 30 MCG/0.3 ML DOSE: CPT | Mod: PBBFAC | Performed by: INTERNAL MEDICINE

## 2024-01-17 ENCOUNTER — HOSPITAL ENCOUNTER (EMERGENCY)
Facility: HOSPITAL | Age: 44
Discharge: HOME | End: 2024-01-17
Attending: EMERGENCY MEDICINE
Payer: MEDICAID

## 2024-01-17 VITALS
TEMPERATURE: 98 F | HEART RATE: 83 BPM | DIASTOLIC BLOOD PRESSURE: 79 MMHG | WEIGHT: 171 LBS | HEIGHT: 64 IN | SYSTOLIC BLOOD PRESSURE: 125 MMHG | RESPIRATION RATE: 19 BRPM | OXYGEN SATURATION: 98 % | BODY MASS INDEX: 29.19 KG/M2

## 2024-01-17 DIAGNOSIS — J06.9 UPPER RESPIRATORY TRACT INFECTION, UNSPECIFIED TYPE: Primary | ICD-10-CM

## 2024-01-17 DIAGNOSIS — R05.9 COUGH: ICD-10-CM

## 2024-01-17 LAB
CTP QC/QA: YES
INFLUENZA A ANTIGEN, POC: NEGATIVE
INFLUENZA B ANTIGEN, POC: NEGATIVE
POC RAPID STREP A: NEGATIVE
SARS-COV-2 RDRP RESP QL NAA+PROBE: NEGATIVE

## 2024-01-17 PROCEDURE — 99283 EMERGENCY DEPT VISIT LOW MDM: CPT | Mod: 25,ER

## 2024-01-17 PROCEDURE — 87804 INFLUENZA ASSAY W/OPTIC: CPT | Mod: 59,ER

## 2024-01-17 PROCEDURE — 87635 SARS-COV-2 COVID-19 AMP PRB: CPT | Mod: ER | Performed by: NURSE PRACTITIONER

## 2024-01-17 RX ORDER — GUAIFENESIN 600 MG/1
1200 TABLET, EXTENDED RELEASE ORAL 2 TIMES DAILY PRN
Qty: 40 TABLET | Refills: 0 | Status: SHIPPED | OUTPATIENT
Start: 2024-01-17 | End: 2024-01-27

## 2024-01-17 RX ORDER — PROMETHAZINE HYDROCHLORIDE AND DEXTROMETHORPHAN HYDROBROMIDE 6.25; 15 MG/5ML; MG/5ML
5 SYRUP ORAL NIGHTLY PRN
Qty: 118 ML | Refills: 0 | Status: SHIPPED | OUTPATIENT
Start: 2024-01-17 | End: 2024-01-27

## 2024-01-17 RX ORDER — FLUTICASONE PROPIONATE 50 MCG
1 SPRAY, SUSPENSION (ML) NASAL 2 TIMES DAILY PRN
Qty: 15 G | Refills: 0 | Status: SHIPPED | OUTPATIENT
Start: 2024-01-17

## 2024-01-17 RX ORDER — CETIRIZINE HYDROCHLORIDE 10 MG/1
10 TABLET ORAL DAILY
Qty: 30 TABLET | Refills: 0 | Status: SHIPPED | OUTPATIENT
Start: 2024-01-17 | End: 2025-01-16

## 2024-01-17 NOTE — ED PROVIDER NOTES
Encounter Date: 2024    SCRIBE #1 NOTE: I, Roselia Randall, am scribing for, and in the presence of,  Radha Evangelista NP.       History     Chief Complaint   Patient presents with    Cough     Cough, occasionally productive x 1.5 weeks, denies other sx     Mecca Bejarano is a 43 y.o. female, with no PMHx , who presents to the ED with cough onset 1.5w ago. Pt states cough is occasionally productive and worsens at night. Pt reports associated sx of congestion,sore throat, and pain when swallowing. Pt attempted treatment with delsym but sx persisted. No other exacerbating or alleviating factors. Denies fever, headache, abdominal pain, or other associated symptoms. Pt has past surgical hx of tonsillectomy.       The history is provided by the patient. No  was used.     Review of patient's allergies indicates:   Allergen Reactions    Progesterone Swelling    Morphine Itching    Morphine sulfate Nausea And Vomiting     Past Medical History:   Diagnosis Date    Acute recurrent streptococcal tonsillitis 2019    History of peritonsillar abscess 2019    Parapharyngeal abscess     Pharyngeal inflammation 2016    Tonsillith 2019    Velopharyngeal insufficiency, acquired 2019     Past Surgical History:   Procedure Laterality Date     SECTION, CLASSIC      TONSILLECTOMY Bilateral 2019    Procedure: TONSILLECTOMY;  Surgeon: BILL Lyle MD;  Location: [facility];  Service: ENT;  Laterality: Bilateral;    TUBAL LIGATION Bilateral      No family history on file.  Social History     Tobacco Use    Smoking status: Never    Smokeless tobacco: Never   Substance Use Topics    Alcohol use: Yes     Comment: little    Drug use: No     Review of Systems   Constitutional:  Negative for chills and fever.   HENT:  Positive for congestion and sore throat.         + Pain swallowing    Eyes:  Negative for pain.   Respiratory:  Positive for cough. Negative for shortness of breath  and wheezing.    Cardiovascular:  Negative for chest pain.   Gastrointestinal:  Negative for abdominal pain, nausea and vomiting.   Genitourinary:  Negative for flank pain.   Musculoskeletal:  Negative for myalgias.   Skin:  Negative for color change.   Neurological:  Negative for dizziness, weakness and headaches.   Hematological:  Does not bruise/bleed easily.   Psychiatric/Behavioral:  Negative for suicidal ideas.    All other systems reviewed and are negative.      Physical Exam     Initial Vitals [01/17/24 1443]   BP Pulse Resp Temp SpO2   121/75 82 19 98 °F (36.7 °C) 97 %      MAP       --         Physical Exam    Constitutional: She appears well-developed and well-nourished. She is not diaphoretic. No distress.   HENT:   Head: Normocephalic and atraumatic.   Right Ear: Hearing, tympanic membrane, external ear and ear canal normal.   Left Ear: Hearing, tympanic membrane, external ear and ear canal normal.   Nose: Mucosal edema and rhinorrhea present.   Mouth/Throat: Posterior oropharyngeal erythema present. No oropharyngeal exudate.   Eyes: Conjunctivae and EOM are normal. Pupils are equal, round, and reactive to light. Right eye exhibits no discharge. Left eye exhibits no discharge.   Neck: Neck supple.   Normal range of motion.  Cardiovascular:  Normal rate, regular rhythm, normal heart sounds and intact distal pulses.           Pulmonary/Chest: Breath sounds normal. No respiratory distress.   Abdominal: Abdomen is soft. Bowel sounds are normal. There is no abdominal tenderness. No hernia. There is no rigidity, no rebound, no guarding, no tenderness at McBurney's point and negative Stallings's sign.   Musculoskeletal:         General: Normal range of motion.      Cervical back: Normal range of motion and neck supple.     Neurological: She is alert and oriented to person, place, and time.   Skin: Skin is warm and dry.   Psychiatric: She has a normal mood and affect. Her behavior is normal.         ED Course    Procedures  Labs Reviewed   SARS-COV-2 RDRP GENE    Narrative:     This test utilizes isothermal nucleic acid amplification technology to detect the SARS-CoV-2 RdRp nucleic acid segment. The analytical sensitivity (limit of detection) is 500 copies/swab.     A POSITIVE result is indicative of the presence of SARS-CoV-2 RNA; clinical correlation with patient history and other diagnostic information is necessary to determine patient infection status.    A NEGATIVE result means that SARS-CoV-2 nucleic acids are not present above the limit of detection. A NEGATIVE result should be treated as presumptive. It does not rule out the possibility of COVID-19 and should not be the sole basis for treatment decisions. If COVID-19 is strongly suspected based on clinical and exposure history, re-testing using an alternate molecular assay should be considered.     This test is only for use under the Food and Drug Administration s Emergency Use Authorization (EUA).     Commercial kits are provided by [employer]. Performance characteristics of the EUA have been independently verified by [facility] Department of Pathology and Laboratory Medicine.   _________________________________________________________________   The authorized Fact Sheet for Healthcare Providers and the authorized Fact Sheet for Patients of the ID NOW COVID-19 are available on the FDA website:    https://www.fda.gov/media/548462/download      https://www.fda.gov/media/811195/download      POCT INFLUENZA A/B MOLECULAR   POCT STREP A MOLECULAR   POCT RAPID INFLUENZA A/B   POCT STREP A, RAPID          Imaging Results              X-Ray Chest PA And Lateral (Final result)  Result time 01/17/24 15:21:35      Final result by Benedicto Perez MD (01/17/24 15:21:35)                   Impression:      No acute process.      Electronically signed by: Benedicto Perez MD  Date:    01/17/2024  Time:    15:21               Narrative:    EXAMINATION:  XR CHEST PA  AND LATERAL    CLINICAL HISTORY:  Cough, unspecified    TECHNIQUE:  PA and lateral views of the chest were performed.    COMPARISON:  None    FINDINGS:  The trachea is unremarkable.  The cardiomediastinal silhouette is within normal limits.  The hilar structures are unremarkable.  There is no evidence of free air beneath hemidiaphragms.  There are no pleural effusions.  There is no evidence of a pneumothorax.  There is no evidence of pneumomediastinum.  No airspace opacity is present.  The osseous structures are unremarkable.                                       Medications - No data to display  Medical Decision Making  This is an evaluation of a 43 y.o. female that presents to the Emergency Department for cough, rhinorrhea and nasal congestion for 1.5 weeks. The patient is a non-toxic, afebrile, and well appearing female. On physical exam ears and pharynx are without evidence of infection. Appears well hydrated with moist mucus membranes. Neck soft and supple with no meningeal signs or cervical lymphadenopathy. Breath sounds are clear and equal bilaterally with no adventitious breath sounds, tachypnea or respiratory distress with room air pulse ox of 97% and no evidence of hypoxia.     Vital Signs Are Reassuring.  RESULTS:   COVID, flu, strep negative.    Chest x-ray unremarkable.    My overall impression is Viral URI. I considered, but at this time, do not suspect OM, OE, strep pharyngitis, meningitis, pneumonia, or acute bacterial sinusitis.    ED return precautions were discussed and understanding was verbalized. All questions or concerns have been addressed.     Amount and/or Complexity of Data Reviewed  Labs: ordered. Decision-making details documented in ED Course.  Radiology: ordered. Decision-making details documented in ED Course.    Risk  OTC drugs.  Prescription drug management.            Scribe Attestation:   Scribe #1: I performed the above scribed service and the documentation accurately describes  the services I performed. I attest to the accuracy of the note.        ED Course as of 01/17/24 1647   Wed Jan 17, 2024   1529 X-Ray Chest PA And Lateral  FINDINGS:  The trachea is unremarkable.  The cardiomediastinal silhouette is within normal limits.  The hilar structures are unremarkable.  There is no evidence of free air beneath hemidiaphragms.  There are no pleural effusions.  There is no evidence of a pneumothorax.  There is no evidence of pneumomediastinum.  No airspace opacity is present.  The osseous structures are unremarkable.     Impression:     No acute process.      [MT]      ED Course User Index  [MT] Radha Evangelista, JUAN CROW, personally performed the services described in this documentation. All medical record entries made by the scribe were at my direction and in my presence. I have reviewed the chart and agree that the record reflects my personal performance and is accurate and complete.    Clinical Impression:  Final diagnoses:  [R05.9] Cough  [J06.9] Upper respiratory tract infection, unspecified type (Primary)          ED Disposition Condition    Discharge Stable          ED Prescriptions       Medication Sig Dispense Start Date End Date Auth. Provider    guaiFENesin (MUCINEX) 600 mg 12 hr tablet Take 2 tablets (1,200 mg total) by mouth 2 (two) times daily as needed for Congestion. 40 tablet 1/17/2024 1/27/2024 Radha Evangelista NP    fluticasone propionate (FLONASE) 50 mcg/actuation nasal spray 1 spray (50 mcg total) by Each Nostril route 2 (two) times daily as needed for Rhinitis or Allergies. 15 g 1/17/2024 -- Radha Evangelista NP    cetirizine (ZYRTEC) 10 MG tablet Take 1 tablet (10 mg total) by mouth once daily. 30 tablet 1/17/2024 1/16/2025 Radha Evangelista NP    promethazine-dextromethorphan (PROMETHAZINE-DM) 6.25-15 mg/5 mL Syrp Take 5 mLs by mouth nightly as needed (cough). 118 mL 1/17/2024 1/27/2024 Radha Evangelista NP          Follow-up  Information       Follow up With Specialties Details Why Contact Info    [facility] -  Schedule an appointment as soon as possible for a visit in 1 day For follow-up if you do not have a primary care doctor [address] [place] [address]  [phone]      [facility] ED Emergency Medicine Go to  If symptoms worsen [id number]  [facility] [id number] [phone]             Radha Evangelista NP  01/17/24 7829

## 2024-01-17 NOTE — DISCHARGE INSTRUCTIONS
COVID, flu, and strep are negative.    You have been prescribed PROMETHAZINE DM for cough. Please do not take this medication while working, drinking alcohol, swimming, or while driving/operating heavy machinery. This medication may cause drowsiness, impair judgment, and reduce physical capabilities.    Thank you for coming to our Emergency Department today. It is important to remember that some problems or medical conditions are difficult to diagnose and may not be found during your Emergency Department visit.     Be sure to follow up with your primary care doctor and review all labs/imaging/tests that were performed during your ER visit with them. Some labs/tests may be outside of the normal range and require non-emergent follow-up and further investigation to help diagnose/exclude/prevent complications or other potentially serious medical conditions that were not addressed during your ER visit.    If you do not have a primary care doctor, you may contact the one listed on your discharge paperwork or you may also call the [facility] Appointment Desk at [phone] to schedule an appointment and establish care with one. It is important to your health that you have a primary care doctor.    Please take all medications as directed. All medications may potentially have side-effects and it is impossible to predict which medications may give you side-effects or what side-effects (if any) they will give you.. If you feel that you are having a negative effect or side-effect of any medication you should immediately stop taking them and seek medical attention. If you feel that you are having a life-threatening reaction call 911.    Return to the ER with any questions/concerns, new/concerning symptoms, worsening or failure to improve.     Do not drive, swim, climb to height, take a bath, operate heavy machinery, drink alcohol or take potentially sedating medications, sign any legal documents or make any important  decisions for 24 hours if you have received any pain medications, sedatives or mood altering drugs during your ER visit or within 24 hours of taking them if they have been prescribed to you.     You can find additional resources for Dentists, hearing aids, durable medical equipment, low cost pharmacies and other resources at [email or web address].[email or web address]    BELOW THIS LINE ONLY APPLIES IF YOU HAVE A COVID TEST PENDING OR IF YOU HAVE BEEN DIAGNOSED WITH COVID:  Please access [facility] to review the results of your test. Until the results of your COVID test return, you should isolate yourself so as not to potentially spread illness to others.   If your COVID test returns positive, you should isolate yourself so as not to spread illness to others. After five full days, if you are feeling better and you have not had fever for 24 hours, you can return to your typical daily activities, but you must wear a mask around others for an additional 5 days.   If your COVID test returns negative and you are either unvaccinated or more than six months out from your two-dose vaccine and are not yet boosted, you should still quarantine for 5 full days followed by strict mask use for an additional 5 full days.   If your COVID test returns negative and you have received your 2-dose initial vaccine as well as a booster, you should continue strict mask use for 10 full days after the exposure.  For all those exposed, best practice includes a test at day 5 after the exposure. This can be a home test or a test through one of the many testing centers throughout our community.   Masking is always advised to limit the spread of COVID. Cdc.gov is an excellent site to obtain the latest up to date recommendations regarding COVID and COVID testing.     CDC Testing and Quarantine Guidelines for patients with exposure to a known-positive COVID-19 person:  A close exposure is defined as anyone who has had an exposure (masked or unmasked) to a known COVID  -19 positive person within 6 feet of someone for a cumulative total of 15 minutes or more over a 24-hour period.   Vaccinated and/or if you recently had a positive covid test within 90 days do NOT need to quarantine after contact with someone who had COVID-19 unless you develop symptoms.   Fully vaccinated people who have not had a positive test within 90 days, should get tested 3-5 days after their exposure, even if they don't have symptoms and wear a mask indoors in public for 14 days following exposure or until their test result is negative.      Unvaccinated and/or NOT had a positive test within 90 days and meet close exposure  You are required by CDC guidelines to quarantine for at least 5 days from time of exposure followed by 5 days of strict masking. It is recommended, but not required to test after 5 days, unless you develop symptoms, in which case you should test at that time.  If you get tested after 5 days and your test is positive, your 5 day period of isolation starts the day of the positive test.    If your exposure does not meet the above definition, you can return to your normal daily activities to include social distancing, wearing a mask and frequent handwashing.      Here is a link to guidance from the CDC:  https://www.cdc.gov/media/releases/2021/s1227-isolation-quarantine-guidance.html      [facility] Testing Sites:  https://ldh.[place].gov/page/3934      [facility] website with testing locations and guidance:  [email or web address]

## (undated) DEVICE — SKIN MARKER DEVON 160

## (undated) DEVICE — Device

## (undated) DEVICE — GLOVE BIOGEL SKINSENSE PI 7.5

## (undated) DEVICE — SPONGE TONSIL DBL STRG MED STR

## (undated) DEVICE — GLOVE BIOGEL SKINSENSE PI 6.5

## (undated) DEVICE — SUT 3-0 PLN YLWSH 1X27 CT-3

## (undated) DEVICE — POSITIONER IV ARMBOARD FOAM

## (undated) DEVICE — CATH RED RUBBER 8FR

## (undated) DEVICE — SEE MEDLINE ITEM 152622

## (undated) DEVICE — ELECTRODE REM PLYHSV RETURN 9

## (undated) DEVICE — POSITIONER HEAD DONUT 9IN FOAM

## (undated) DEVICE — TUBE SUMP NASOGASTRIC 16FR

## (undated) DEVICE — GLOVE BIOGEL SKINSENSE PI 7.0